# Patient Record
Sex: MALE | Race: WHITE | Employment: OTHER | URBAN - METROPOLITAN AREA
[De-identification: names, ages, dates, MRNs, and addresses within clinical notes are randomized per-mention and may not be internally consistent; named-entity substitution may affect disease eponyms.]

---

## 2017-08-30 ENCOUNTER — HOSPITAL ENCOUNTER (OUTPATIENT)
Dept: LAB | Age: 52
Discharge: HOME | End: 2017-08-30

## 2017-08-30 PROCEDURE — 88305 TISSUE EXAM BY PATHOLOGIST: CPT | Performed by: INTERNAL MEDICINE

## 2017-08-30 PROCEDURE — 88312 SPECIAL STAINS GROUP 1: CPT | Performed by: INTERNAL MEDICINE

## 2020-01-01 ENCOUNTER — APPOINTMENT (OUTPATIENT)
Dept: GENERAL RADIOLOGY | Age: 55
DRG: 603 | End: 2020-01-01
Attending: NURSE PRACTITIONER
Payer: COMMERCIAL

## 2020-01-01 ENCOUNTER — HOSPITAL ENCOUNTER (INPATIENT)
Age: 55
LOS: 5 days | Discharge: NURSING, REHAB OR LONG-TERM CARE | DRG: 603 | End: 2020-11-05
Attending: EMERGENCY MEDICINE | Admitting: HOSPITALIST
Payer: COMMERCIAL

## 2020-01-01 ENCOUNTER — APPOINTMENT (OUTPATIENT)
Dept: GENERAL RADIOLOGY | Age: 55
DRG: 603 | End: 2020-01-01
Attending: HOSPITALIST
Payer: COMMERCIAL

## 2020-01-01 VITALS
DIASTOLIC BLOOD PRESSURE: 84 MMHG | BODY MASS INDEX: 42.66 KG/M2 | HEART RATE: 76 BPM | RESPIRATION RATE: 18 BRPM | WEIGHT: 315 LBS | HEIGHT: 72 IN | OXYGEN SATURATION: 93 % | TEMPERATURE: 98.5 F | SYSTOLIC BLOOD PRESSURE: 145 MMHG

## 2020-01-01 DIAGNOSIS — S31.109A OPEN ABDOMINAL WALL WOUND, INITIAL ENCOUNTER: Primary | ICD-10-CM

## 2020-01-01 DIAGNOSIS — M48.062 LUMBAR STENOSIS WITH NEUROGENIC CLAUDICATION: ICD-10-CM

## 2020-01-01 LAB
ALBUMIN SERPL-MCNC: 2.6 G/DL (ref 3.5–5)
ALBUMIN SERPL-MCNC: 3.3 G/DL (ref 3.5–5)
ALBUMIN/GLOB SERPL: 0.7 {RATIO} (ref 1.2–3.5)
ALBUMIN/GLOB SERPL: 0.8 {RATIO} (ref 1.2–3.5)
ALP SERPL-CCNC: 100 U/L (ref 50–136)
ALP SERPL-CCNC: 84 U/L (ref 50–136)
ALT SERPL-CCNC: 57 U/L (ref 12–65)
ALT SERPL-CCNC: 75 U/L (ref 12–65)
ANION GAP SERPL CALC-SCNC: 3 MMOL/L (ref 7–16)
ANION GAP SERPL CALC-SCNC: 5 MMOL/L (ref 7–16)
ANION GAP SERPL CALC-SCNC: 5 MMOL/L (ref 7–16)
ANION GAP SERPL CALC-SCNC: 6 MMOL/L (ref 7–16)
ANION GAP SERPL CALC-SCNC: 9 MMOL/L (ref 7–16)
AST SERPL-CCNC: 39 U/L (ref 15–37)
AST SERPL-CCNC: 42 U/L (ref 15–37)
BACTERIA SPEC CULT: ABNORMAL
BACTERIA SPEC CULT: NORMAL
BACTERIA SPEC CULT: NORMAL
BASOPHILS # BLD: 0.1 K/UL (ref 0–0.2)
BASOPHILS NFR BLD: 1 % (ref 0–2)
BILIRUB SERPL-MCNC: 0.5 MG/DL (ref 0.2–1.1)
BILIRUB SERPL-MCNC: 0.6 MG/DL (ref 0.2–1.1)
BUN SERPL-MCNC: 11 MG/DL (ref 6–23)
BUN SERPL-MCNC: 15 MG/DL (ref 6–23)
BUN SERPL-MCNC: 15 MG/DL (ref 6–23)
BUN SERPL-MCNC: 8 MG/DL (ref 6–23)
BUN SERPL-MCNC: 9 MG/DL (ref 6–23)
CALCIUM SERPL-MCNC: 7.7 MG/DL (ref 8.3–10.4)
CALCIUM SERPL-MCNC: 8 MG/DL (ref 8.3–10.4)
CALCIUM SERPL-MCNC: 8.2 MG/DL (ref 8.3–10.4)
CALCIUM SERPL-MCNC: 8.4 MG/DL (ref 8.3–10.4)
CALCIUM SERPL-MCNC: 9.1 MG/DL (ref 8.3–10.4)
CHLORIDE SERPL-SCNC: 104 MMOL/L (ref 98–107)
CHLORIDE SERPL-SCNC: 109 MMOL/L (ref 98–107)
CHLORIDE SERPL-SCNC: 110 MMOL/L (ref 98–107)
CHLORIDE SERPL-SCNC: 111 MMOL/L (ref 98–107)
CHLORIDE SERPL-SCNC: 111 MMOL/L (ref 98–107)
CO2 SERPL-SCNC: 26 MMOL/L (ref 21–32)
CO2 SERPL-SCNC: 27 MMOL/L (ref 21–32)
CO2 SERPL-SCNC: 28 MMOL/L (ref 21–32)
COVID-19 RAPID TEST, COVR: NOT DETECTED
CREAT SERPL-MCNC: 0.86 MG/DL (ref 0.8–1.5)
CREAT SERPL-MCNC: 0.89 MG/DL (ref 0.8–1.5)
CREAT SERPL-MCNC: 0.95 MG/DL (ref 0.8–1.5)
CREAT SERPL-MCNC: 0.95 MG/DL (ref 0.8–1.5)
CREAT SERPL-MCNC: 1.06 MG/DL (ref 0.8–1.5)
DIFFERENTIAL METHOD BLD: ABNORMAL
EOSINOPHIL # BLD: 0.1 K/UL (ref 0–0.8)
EOSINOPHIL # BLD: 0.2 K/UL (ref 0–0.8)
EOSINOPHIL NFR BLD: 1 % (ref 0.5–7.8)
EOSINOPHIL NFR BLD: 3 % (ref 0.5–7.8)
ERYTHROCYTE [DISTWIDTH] IN BLOOD BY AUTOMATED COUNT: 13.8 % (ref 11.9–14.6)
ERYTHROCYTE [DISTWIDTH] IN BLOOD BY AUTOMATED COUNT: 13.9 % (ref 11.9–14.6)
ERYTHROCYTE [DISTWIDTH] IN BLOOD BY AUTOMATED COUNT: 14 % (ref 11.9–14.6)
GLOBULIN SER CALC-MCNC: 3.8 G/DL (ref 2.3–3.5)
GLOBULIN SER CALC-MCNC: 4.4 G/DL (ref 2.3–3.5)
GLUCOSE BLD STRIP.AUTO-MCNC: 102 MG/DL (ref 65–100)
GLUCOSE BLD STRIP.AUTO-MCNC: 107 MG/DL (ref 65–100)
GLUCOSE BLD STRIP.AUTO-MCNC: 108 MG/DL (ref 65–100)
GLUCOSE BLD STRIP.AUTO-MCNC: 111 MG/DL (ref 65–100)
GLUCOSE BLD STRIP.AUTO-MCNC: 111 MG/DL (ref 65–100)
GLUCOSE BLD STRIP.AUTO-MCNC: 115 MG/DL (ref 65–100)
GLUCOSE BLD STRIP.AUTO-MCNC: 118 MG/DL (ref 65–100)
GLUCOSE BLD STRIP.AUTO-MCNC: 120 MG/DL (ref 65–100)
GLUCOSE BLD STRIP.AUTO-MCNC: 120 MG/DL (ref 65–100)
GLUCOSE BLD STRIP.AUTO-MCNC: 122 MG/DL (ref 65–100)
GLUCOSE BLD STRIP.AUTO-MCNC: 124 MG/DL (ref 65–100)
GLUCOSE BLD STRIP.AUTO-MCNC: 124 MG/DL (ref 65–100)
GLUCOSE BLD STRIP.AUTO-MCNC: 128 MG/DL (ref 65–100)
GLUCOSE BLD STRIP.AUTO-MCNC: 130 MG/DL (ref 65–100)
GLUCOSE BLD STRIP.AUTO-MCNC: 136 MG/DL (ref 65–100)
GLUCOSE BLD STRIP.AUTO-MCNC: 142 MG/DL (ref 65–100)
GLUCOSE BLD STRIP.AUTO-MCNC: 83 MG/DL (ref 65–100)
GLUCOSE BLD STRIP.AUTO-MCNC: 86 MG/DL (ref 65–100)
GLUCOSE BLD STRIP.AUTO-MCNC: 95 MG/DL (ref 65–100)
GLUCOSE BLD STRIP.AUTO-MCNC: 97 MG/DL (ref 65–100)
GLUCOSE SERPL-MCNC: 109 MG/DL (ref 65–100)
GLUCOSE SERPL-MCNC: 84 MG/DL (ref 65–100)
GLUCOSE SERPL-MCNC: 92 MG/DL (ref 65–100)
GLUCOSE SERPL-MCNC: 93 MG/DL (ref 65–100)
GLUCOSE SERPL-MCNC: 93 MG/DL (ref 65–100)
GRAM STN SPEC: ABNORMAL
GRAM STN SPEC: ABNORMAL
HCT VFR BLD AUTO: 30.9 % (ref 41.1–50.3)
HCT VFR BLD AUTO: 31.1 % (ref 41.1–50.3)
HCT VFR BLD AUTO: 31.9 % (ref 41.1–50.3)
HCT VFR BLD AUTO: 34.5 % (ref 41.1–50.3)
HCT VFR BLD AUTO: 35 % (ref 41.1–50.3)
HGB BLD-MCNC: 10.3 G/DL (ref 13.6–17.2)
HGB BLD-MCNC: 10.4 G/DL (ref 13.6–17.2)
HGB BLD-MCNC: 10.6 G/DL (ref 13.6–17.2)
HGB BLD-MCNC: 11.7 G/DL (ref 13.6–17.2)
HGB BLD-MCNC: 11.7 G/DL (ref 13.6–17.2)
IMM GRANULOCYTES # BLD AUTO: 0.1 K/UL (ref 0–0.5)
IMM GRANULOCYTES NFR BLD AUTO: 1 % (ref 0–5)
IMM GRANULOCYTES NFR BLD AUTO: 2 % (ref 0–5)
LACTATE SERPL-SCNC: 1 MMOL/L (ref 0.4–2)
LACTATE SERPL-SCNC: 1 MMOL/L (ref 0.4–2)
LYMPHOCYTES # BLD: 1.5 K/UL (ref 0.5–4.6)
LYMPHOCYTES # BLD: 1.6 K/UL (ref 0.5–4.6)
LYMPHOCYTES # BLD: 1.6 K/UL (ref 0.5–4.6)
LYMPHOCYTES # BLD: 1.8 K/UL (ref 0.5–4.6)
LYMPHOCYTES # BLD: 2 K/UL (ref 0.5–4.6)
LYMPHOCYTES NFR BLD: 17 % (ref 13–44)
LYMPHOCYTES NFR BLD: 28 % (ref 13–44)
LYMPHOCYTES NFR BLD: 28 % (ref 13–44)
LYMPHOCYTES NFR BLD: 30 % (ref 13–44)
LYMPHOCYTES NFR BLD: 30 % (ref 13–44)
MCH RBC QN AUTO: 32.5 PG (ref 26.1–32.9)
MCH RBC QN AUTO: 32.7 PG (ref 26.1–32.9)
MCH RBC QN AUTO: 33 PG (ref 26.1–32.9)
MCHC RBC AUTO-ENTMCNC: 33.1 G/DL (ref 31.4–35)
MCHC RBC AUTO-ENTMCNC: 33.2 G/DL (ref 31.4–35)
MCHC RBC AUTO-ENTMCNC: 33.4 G/DL (ref 31.4–35)
MCHC RBC AUTO-ENTMCNC: 33.7 G/DL (ref 31.4–35)
MCHC RBC AUTO-ENTMCNC: 33.9 G/DL (ref 31.4–35)
MCV RBC AUTO: 96.4 FL (ref 79.6–97.8)
MCV RBC AUTO: 97.2 FL (ref 79.6–97.8)
MCV RBC AUTO: 98.1 FL (ref 79.6–97.8)
MCV RBC AUTO: 98.5 FL (ref 79.6–97.8)
MCV RBC AUTO: 98.6 FL (ref 79.6–97.8)
MM INDURATION POC: 0 MM (ref 0–5)
MM INDURATION POC: 0 MM (ref 0–5)
MONOCYTES # BLD: 0.5 K/UL (ref 0.1–1.3)
MONOCYTES # BLD: 0.6 K/UL (ref 0.1–1.3)
MONOCYTES # BLD: 1.1 K/UL (ref 0.1–1.3)
MONOCYTES NFR BLD: 10 % (ref 4–12)
MONOCYTES NFR BLD: 10 % (ref 4–12)
MONOCYTES NFR BLD: 11 % (ref 4–12)
MONOCYTES NFR BLD: 11 % (ref 4–12)
MONOCYTES NFR BLD: 8 % (ref 4–12)
NEUTS SEG # BLD: 2.8 K/UL (ref 1.7–8.2)
NEUTS SEG # BLD: 3.2 K/UL (ref 1.7–8.2)
NEUTS SEG # BLD: 3.2 K/UL (ref 1.7–8.2)
NEUTS SEG # BLD: 4.3 K/UL (ref 1.7–8.2)
NEUTS SEG # BLD: 6.8 K/UL (ref 1.7–8.2)
NEUTS SEG NFR BLD: 55 % (ref 43–78)
NEUTS SEG NFR BLD: 55 % (ref 43–78)
NEUTS SEG NFR BLD: 57 % (ref 43–78)
NEUTS SEG NFR BLD: 59 % (ref 43–78)
NEUTS SEG NFR BLD: 69 % (ref 43–78)
NRBC # BLD: 0 K/UL (ref 0–0.2)
PLATELET # BLD AUTO: 159 K/UL (ref 150–450)
PLATELET # BLD AUTO: 201 K/UL (ref 150–450)
PLATELET # BLD AUTO: 204 K/UL (ref 150–450)
PLATELET # BLD AUTO: 205 K/UL (ref 150–450)
PLATELET # BLD AUTO: 216 K/UL (ref 150–450)
PMV BLD AUTO: 8.7 FL (ref 9.4–12.3)
PMV BLD AUTO: 8.9 FL (ref 9.4–12.3)
PMV BLD AUTO: 9 FL (ref 9.4–12.3)
PMV BLD AUTO: 9 FL (ref 9.4–12.3)
PMV BLD AUTO: 9.4 FL (ref 9.4–12.3)
POTASSIUM SERPL-SCNC: 3.4 MMOL/L (ref 3.5–5.1)
POTASSIUM SERPL-SCNC: 3.7 MMOL/L (ref 3.5–5.1)
POTASSIUM SERPL-SCNC: 3.7 MMOL/L (ref 3.5–5.1)
POTASSIUM SERPL-SCNC: 3.9 MMOL/L (ref 3.5–5.1)
POTASSIUM SERPL-SCNC: 4 MMOL/L (ref 3.5–5.1)
PPD POC: NEGATIVE NEGATIVE
PPD POC: NEGATIVE NEGATIVE
PROCALCITONIN SERPL-MCNC: <0.05 NG/ML
PROT SERPL-MCNC: 6.4 G/DL (ref 6.3–8.2)
PROT SERPL-MCNC: 7.7 G/DL (ref 6.3–8.2)
RBC # BLD AUTO: 3.17 M/UL (ref 4.23–5.6)
RBC # BLD AUTO: 3.18 M/UL (ref 4.23–5.6)
RBC # BLD AUTO: 3.24 M/UL (ref 4.23–5.6)
RBC # BLD AUTO: 3.55 M/UL (ref 4.23–5.6)
RBC # BLD AUTO: 3.58 M/UL (ref 4.23–5.6)
SARS COV-2, XPGCVT: NEGATIVE
SERVICE CMNT-IMP: ABNORMAL
SERVICE CMNT-IMP: NORMAL
SERVICE CMNT-IMP: NORMAL
SODIUM SERPL-SCNC: 139 MMOL/L (ref 138–145)
SODIUM SERPL-SCNC: 141 MMOL/L (ref 136–145)
SODIUM SERPL-SCNC: 142 MMOL/L (ref 136–145)
SODIUM SERPL-SCNC: 143 MMOL/L (ref 136–145)
SODIUM SERPL-SCNC: 143 MMOL/L (ref 136–145)
SOURCE, COVRS: NORMAL
VANCOMYCIN TROUGH SERPL-MCNC: 13 UG/ML (ref 5–20)
WBC # BLD AUTO: 5.1 K/UL (ref 4.3–11.1)
WBC # BLD AUTO: 5.6 K/UL (ref 4.3–11.1)
WBC # BLD AUTO: 5.9 K/UL (ref 4.3–11.1)
WBC # BLD AUTO: 7.3 K/UL (ref 4.3–11.1)
WBC # BLD AUTO: 9.8 K/UL (ref 4.3–11.1)

## 2020-01-01 PROCEDURE — 85025 COMPLETE CBC W/AUTO DIFF WBC: CPT

## 2020-01-01 PROCEDURE — 65270000029 HC RM PRIVATE

## 2020-01-01 PROCEDURE — 2709999900 HC NON-CHARGEABLE SUPPLY

## 2020-01-01 PROCEDURE — 82962 GLUCOSE BLOOD TEST: CPT

## 2020-01-01 PROCEDURE — 36415 COLL VENOUS BLD VENIPUNCTURE: CPT

## 2020-01-01 PROCEDURE — 86580 TB INTRADERMAL TEST: CPT | Performed by: INTERNAL MEDICINE

## 2020-01-01 PROCEDURE — 80053 COMPREHEN METABOLIC PANEL: CPT

## 2020-01-01 PROCEDURE — 87040 BLOOD CULTURE FOR BACTERIA: CPT

## 2020-01-01 PROCEDURE — 74011000258 HC RX REV CODE- 258: Performed by: HOSPITALIST

## 2020-01-01 PROCEDURE — 74011250636 HC RX REV CODE- 250/636: Performed by: HOSPITALIST

## 2020-01-01 PROCEDURE — 74011250636 HC RX REV CODE- 250/636: Performed by: FAMILY MEDICINE

## 2020-01-01 PROCEDURE — 74018 RADEX ABDOMEN 1 VIEW: CPT

## 2020-01-01 PROCEDURE — 87077 CULTURE AEROBIC IDENTIFY: CPT

## 2020-01-01 PROCEDURE — 87205 SMEAR GRAM STAIN: CPT

## 2020-01-01 PROCEDURE — 74011250637 HC RX REV CODE- 250/637: Performed by: NURSE PRACTITIONER

## 2020-01-01 PROCEDURE — 96375 TX/PRO/DX INJ NEW DRUG ADDON: CPT

## 2020-01-01 PROCEDURE — 83605 ASSAY OF LACTIC ACID: CPT

## 2020-01-01 PROCEDURE — 84145 PROCALCITONIN (PCT): CPT

## 2020-01-01 PROCEDURE — 74011250637 HC RX REV CODE- 250/637: Performed by: HOSPITALIST

## 2020-01-01 PROCEDURE — 97110 THERAPEUTIC EXERCISES: CPT

## 2020-01-01 PROCEDURE — 97161 PT EVAL LOW COMPLEX 20 MIN: CPT

## 2020-01-01 PROCEDURE — 74011000302 HC RX REV CODE- 302: Performed by: INTERNAL MEDICINE

## 2020-01-01 PROCEDURE — 80048 BASIC METABOLIC PNL TOTAL CA: CPT

## 2020-01-01 PROCEDURE — 74011250636 HC RX REV CODE- 250/636: Performed by: EMERGENCY MEDICINE

## 2020-01-01 PROCEDURE — 99284 EMERGENCY DEPT VISIT MOD MDM: CPT

## 2020-01-01 PROCEDURE — 96366 THER/PROPH/DIAG IV INF ADDON: CPT

## 2020-01-01 PROCEDURE — 97530 THERAPEUTIC ACTIVITIES: CPT

## 2020-01-01 PROCEDURE — 96365 THER/PROPH/DIAG IV INF INIT: CPT

## 2020-01-01 PROCEDURE — 94660 CPAP INITIATION&MGMT: CPT

## 2020-01-01 PROCEDURE — 74011000250 HC RX REV CODE- 250: Performed by: FAMILY MEDICINE

## 2020-01-01 PROCEDURE — 87186 SC STD MICRODIL/AGAR DIL: CPT

## 2020-01-01 PROCEDURE — A4565 SLINGS: HCPCS

## 2020-01-01 PROCEDURE — 80202 ASSAY OF VANCOMYCIN: CPT

## 2020-01-01 PROCEDURE — 87635 SARS-COV-2 COVID-19 AMP PRB: CPT

## 2020-01-01 PROCEDURE — 97535 SELF CARE MNGMENT TRAINING: CPT

## 2020-01-01 PROCEDURE — 73030 X-RAY EXAM OF SHOULDER: CPT

## 2020-01-01 PROCEDURE — 77030018846 HC SOL IRR STRL H20 ICUM -A

## 2020-01-01 PROCEDURE — 74011000250 HC RX REV CODE- 250: Performed by: NURSE PRACTITIONER

## 2020-01-01 PROCEDURE — 72040 X-RAY EXAM NECK SPINE 2-3 VW: CPT

## 2020-01-01 PROCEDURE — 74011000258 HC RX REV CODE- 258: Performed by: EMERGENCY MEDICINE

## 2020-01-01 PROCEDURE — 97166 OT EVAL MOD COMPLEX 45 MIN: CPT

## 2020-01-01 PROCEDURE — 74011250636 HC RX REV CODE- 250/636: Performed by: NURSE PRACTITIONER

## 2020-01-01 PROCEDURE — 77030040361 HC SLV COMPR DVT MDII -B

## 2020-01-01 RX ORDER — CLONAZEPAM 2 MG/1
2 TABLET ORAL DAILY
Qty: 3 TAB | Refills: 0 | Status: SHIPPED | OUTPATIENT
Start: 2020-01-01 | End: 2020-01-01

## 2020-01-01 RX ORDER — HYDRALAZINE HYDROCHLORIDE 20 MG/ML
10 INJECTION INTRAMUSCULAR; INTRAVENOUS ONCE
Status: COMPLETED | OUTPATIENT
Start: 2020-01-01 | End: 2020-01-01

## 2020-01-01 RX ORDER — CARVEDILOL 6.25 MG/1
6.25 TABLET ORAL 2 TIMES DAILY WITH MEALS
Status: DISCONTINUED | OUTPATIENT
Start: 2020-01-01 | End: 2020-01-01 | Stop reason: HOSPADM

## 2020-01-01 RX ORDER — POTASSIUM CHLORIDE 20 MEQ/1
40 TABLET, EXTENDED RELEASE ORAL 2 TIMES DAILY
Status: COMPLETED | OUTPATIENT
Start: 2020-01-01 | End: 2020-01-01

## 2020-01-01 RX ORDER — OXYCODONE HYDROCHLORIDE 5 MG/1
5 TABLET ORAL
Status: DISCONTINUED | OUTPATIENT
Start: 2020-01-01 | End: 2020-01-01 | Stop reason: HOSPADM

## 2020-01-01 RX ORDER — ONDANSETRON 2 MG/ML
4 INJECTION INTRAMUSCULAR; INTRAVENOUS
Status: CANCELLED | OUTPATIENT
Start: 2020-01-01

## 2020-01-01 RX ORDER — SODIUM CHLORIDE 9 MG/ML
100 INJECTION, SOLUTION INTRAVENOUS CONTINUOUS
Status: DISCONTINUED | OUTPATIENT
Start: 2020-01-01 | End: 2020-01-01 | Stop reason: HOSPADM

## 2020-01-01 RX ORDER — LANOLIN ALCOHOL/MO/W.PET/CERES
CREAM (GRAM) TOPICAL DAILY
COMMUNITY
End: 2020-01-01

## 2020-01-01 RX ORDER — HYDROCODONE BITARTRATE AND ACETAMINOPHEN 5; 325 MG/1; MG/1
1 TABLET ORAL
Status: DISCONTINUED | OUTPATIENT
Start: 2020-01-01 | End: 2020-01-01

## 2020-01-01 RX ORDER — SPIRONOLACTONE 25 MG/1
50 TABLET ORAL DAILY
Status: DISCONTINUED | OUTPATIENT
Start: 2020-01-01 | End: 2020-01-01 | Stop reason: HOSPADM

## 2020-01-01 RX ORDER — ARIPIPRAZOLE 5 MG/1
15 TABLET ORAL DAILY
Status: DISCONTINUED | OUTPATIENT
Start: 2020-01-01 | End: 2020-01-01 | Stop reason: HOSPADM

## 2020-01-01 RX ORDER — HYDRALAZINE HYDROCHLORIDE 20 MG/ML
10 INJECTION INTRAMUSCULAR; INTRAVENOUS
Status: DISCONTINUED | OUTPATIENT
Start: 2020-01-01 | End: 2020-01-01 | Stop reason: HOSPADM

## 2020-01-01 RX ORDER — VANCOMYCIN/0.9 % SOD CHLORIDE 1.5G/250ML
1500 PLASTIC BAG, INJECTION (ML) INTRAVENOUS EVERY 12 HOURS
Status: DISCONTINUED | OUTPATIENT
Start: 2020-01-01 | End: 2020-01-01

## 2020-01-01 RX ORDER — CARVEDILOL 3.12 MG/1
3.12 TABLET ORAL 2 TIMES DAILY WITH MEALS
Status: DISCONTINUED | OUTPATIENT
Start: 2020-01-01 | End: 2020-01-01

## 2020-01-01 RX ORDER — SPIRONOLACTONE 50 MG/1
50 TABLET, FILM COATED ORAL DAILY
COMMUNITY

## 2020-01-01 RX ORDER — CIPROFLOXACIN 500 MG/1
500 TABLET ORAL 2 TIMES DAILY
Qty: 20 TAB | Refills: 0 | Status: SHIPPED | OUTPATIENT
Start: 2020-01-01 | End: 2020-01-01

## 2020-01-01 RX ORDER — FINASTERIDE 5 MG/1
5 TABLET, FILM COATED ORAL DAILY
Status: DISCONTINUED | OUTPATIENT
Start: 2020-01-01 | End: 2020-01-01 | Stop reason: HOSPADM

## 2020-01-01 RX ORDER — TRAZODONE HYDROCHLORIDE 50 MG/1
300 TABLET ORAL
Status: DISCONTINUED | OUTPATIENT
Start: 2020-01-01 | End: 2020-01-01 | Stop reason: HOSPADM

## 2020-01-01 RX ORDER — CLONAZEPAM 1 MG/1
2 TABLET ORAL DAILY
Status: DISCONTINUED | OUTPATIENT
Start: 2020-01-01 | End: 2020-01-01 | Stop reason: HOSPADM

## 2020-01-01 RX ORDER — CARVEDILOL 6.25 MG/1
6.25 TABLET ORAL 2 TIMES DAILY WITH MEALS
Qty: 60 TAB | Refills: 11 | Status: SHIPPED | OUTPATIENT
Start: 2020-01-01 | End: 2020-01-01

## 2020-01-01 RX ORDER — LABETALOL HYDROCHLORIDE 5 MG/ML
10 INJECTION, SOLUTION INTRAVENOUS ONCE
Status: COMPLETED | OUTPATIENT
Start: 2020-01-01 | End: 2020-01-01

## 2020-01-01 RX ORDER — INSULIN LISPRO 100 [IU]/ML
INJECTION, SOLUTION INTRAVENOUS; SUBCUTANEOUS
Status: DISCONTINUED | OUTPATIENT
Start: 2020-01-01 | End: 2020-01-01 | Stop reason: HOSPADM

## 2020-01-01 RX ORDER — LAMOTRIGINE 100 MG/1
200 TABLET ORAL 2 TIMES DAILY
Status: DISCONTINUED | OUTPATIENT
Start: 2020-01-01 | End: 2020-01-01 | Stop reason: HOSPADM

## 2020-01-01 RX ORDER — GABAPENTIN 100 MG/1
100 CAPSULE ORAL 3 TIMES DAILY
Status: DISCONTINUED | OUTPATIENT
Start: 2020-01-01 | End: 2020-01-01 | Stop reason: HOSPADM

## 2020-01-01 RX ORDER — SODIUM CHLORIDE 9 MG/ML
125 INJECTION, SOLUTION INTRAVENOUS CONTINUOUS
Status: DISCONTINUED | OUTPATIENT
Start: 2020-01-01 | End: 2020-01-01

## 2020-01-01 RX ORDER — GABAPENTIN 100 MG/1
CAPSULE ORAL 3 TIMES DAILY
COMMUNITY

## 2020-01-01 RX ORDER — LORAZEPAM 2 MG/ML
0.5 INJECTION INTRAMUSCULAR ONCE
Status: COMPLETED | OUTPATIENT
Start: 2020-01-01 | End: 2020-01-01

## 2020-01-01 RX ORDER — CLONAZEPAM 1 MG/1
2 TABLET ORAL 2 TIMES DAILY
Status: DISCONTINUED | OUTPATIENT
Start: 2020-01-01 | End: 2020-01-01

## 2020-01-01 RX ORDER — SODIUM CHLORIDE 0.9 % (FLUSH) 0.9 %
5-40 SYRINGE (ML) INJECTION AS NEEDED
Status: DISCONTINUED | OUTPATIENT
Start: 2020-01-01 | End: 2020-01-01 | Stop reason: HOSPADM

## 2020-01-01 RX ORDER — POLYETHYLENE GLYCOL 3350 17 G/17G
17 POWDER, FOR SOLUTION ORAL DAILY
Status: DISCONTINUED | OUTPATIENT
Start: 2020-01-01 | End: 2020-01-01 | Stop reason: HOSPADM

## 2020-01-01 RX ORDER — MORPHINE SULFATE 2 MG/ML
2 INJECTION, SOLUTION INTRAMUSCULAR; INTRAVENOUS
Status: DISCONTINUED | OUTPATIENT
Start: 2020-01-01 | End: 2020-01-01

## 2020-01-01 RX ORDER — CARVEDILOL 3.12 MG/1
3.12 TABLET ORAL 2 TIMES DAILY WITH MEALS
COMMUNITY
End: 2020-01-01

## 2020-01-01 RX ORDER — SODIUM CHLORIDE 0.9 % (FLUSH) 0.9 %
5-40 SYRINGE (ML) INJECTION EVERY 8 HOURS
Status: DISCONTINUED | OUTPATIENT
Start: 2020-01-01 | End: 2020-01-01 | Stop reason: HOSPADM

## 2020-01-01 RX ORDER — LEVOTHYROXINE SODIUM 112 UG/1
112 TABLET ORAL
Status: DISCONTINUED | OUTPATIENT
Start: 2020-01-01 | End: 2020-01-01 | Stop reason: HOSPADM

## 2020-01-01 RX ORDER — HYDROCODONE BITARTRATE AND ACETAMINOPHEN 7.5; 325 MG/1; MG/1
1 TABLET ORAL
Status: DISCONTINUED | OUTPATIENT
Start: 2020-01-01 | End: 2020-01-01

## 2020-01-01 RX ORDER — FOLIC ACID 1 MG/1
1 TABLET ORAL DAILY
Status: DISCONTINUED | OUTPATIENT
Start: 2020-01-01 | End: 2020-01-01 | Stop reason: HOSPADM

## 2020-01-01 RX ORDER — TAMSULOSIN HYDROCHLORIDE 0.4 MG/1
0.4 CAPSULE ORAL DAILY
Status: DISCONTINUED | OUTPATIENT
Start: 2020-01-01 | End: 2020-01-01 | Stop reason: HOSPADM

## 2020-01-01 RX ORDER — NALOXONE HYDROCHLORIDE 0.4 MG/ML
0.4 INJECTION, SOLUTION INTRAMUSCULAR; INTRAVENOUS; SUBCUTANEOUS AS NEEDED
Status: DISCONTINUED | OUTPATIENT
Start: 2020-01-01 | End: 2020-01-01 | Stop reason: HOSPADM

## 2020-01-01 RX ORDER — PANTOPRAZOLE SODIUM 40 MG/1
40 TABLET, DELAYED RELEASE ORAL
Status: DISCONTINUED | OUTPATIENT
Start: 2020-01-01 | End: 2020-01-01 | Stop reason: HOSPADM

## 2020-01-01 RX ORDER — VENLAFAXINE HYDROCHLORIDE 37.5 MG/1
75 CAPSULE, EXTENDED RELEASE ORAL DAILY
Status: DISCONTINUED | OUTPATIENT
Start: 2020-01-01 | End: 2020-01-01 | Stop reason: HOSPADM

## 2020-01-01 RX ORDER — VANCOMYCIN HYDROCHLORIDE 1 G/20ML
INJECTION, POWDER, LYOPHILIZED, FOR SOLUTION INTRAVENOUS ONCE
Status: DISCONTINUED | OUTPATIENT
Start: 2020-01-01 | End: 2020-01-01 | Stop reason: DRUGHIGH

## 2020-01-01 RX ORDER — OXYCODONE HYDROCHLORIDE 5 MG/1
5 TABLET ORAL
Qty: 18 TAB | Refills: 0 | Status: SHIPPED | OUTPATIENT
Start: 2020-01-01 | End: 2020-01-01

## 2020-01-01 RX ORDER — VANCOMYCIN 2 GRAM/500 ML IN 0.9 % SODIUM CHLORIDE INTRAVENOUS
2000 ONCE
Status: COMPLETED | OUTPATIENT
Start: 2020-01-01 | End: 2020-01-01

## 2020-01-01 RX ORDER — ONDANSETRON 2 MG/ML
4 INJECTION INTRAMUSCULAR; INTRAVENOUS
Status: DISCONTINUED | OUTPATIENT
Start: 2020-01-01 | End: 2020-01-01 | Stop reason: HOSPADM

## 2020-01-01 RX ORDER — LANOLIN ALCOHOL/MO/W.PET/CERES
100 CREAM (GRAM) TOPICAL DAILY
Status: DISCONTINUED | OUTPATIENT
Start: 2020-01-01 | End: 2020-01-01 | Stop reason: HOSPADM

## 2020-01-01 RX ORDER — CLONAZEPAM 1 MG/1
4 TABLET ORAL
Status: DISCONTINUED | OUTPATIENT
Start: 2020-01-01 | End: 2020-01-01 | Stop reason: HOSPADM

## 2020-01-01 RX ORDER — ATORVASTATIN CALCIUM 10 MG/1
10 TABLET, FILM COATED ORAL DAILY
Status: DISCONTINUED | OUTPATIENT
Start: 2020-01-01 | End: 2020-01-01 | Stop reason: HOSPADM

## 2020-01-01 RX ADMIN — GABAPENTIN 100 MG: 100 CAPSULE ORAL at 08:43

## 2020-01-01 RX ADMIN — HYDROCODONE BITARTRATE AND ACETAMINOPHEN 1 TABLET: 7.5; 325 TABLET ORAL at 00:50

## 2020-01-01 RX ADMIN — GABAPENTIN 100 MG: 100 CAPSULE ORAL at 16:19

## 2020-01-01 RX ADMIN — SODIUM HYPOCHLORITE: 1.25 SOLUTION TOPICAL at 17:46

## 2020-01-01 RX ADMIN — FINASTERIDE 5 MG: 5 TABLET, FILM COATED ORAL at 09:04

## 2020-01-01 RX ADMIN — RIVAROXABAN 20 MG: 20 TABLET, FILM COATED ORAL at 08:56

## 2020-01-01 RX ADMIN — MORPHINE SULFATE 2 MG: 2 INJECTION, SOLUTION INTRAMUSCULAR; INTRAVENOUS at 09:01

## 2020-01-01 RX ADMIN — LAMOTRIGINE 200 MG: 100 TABLET ORAL at 17:12

## 2020-01-01 RX ADMIN — LAMOTRIGINE 200 MG: 100 TABLET ORAL at 08:19

## 2020-01-01 RX ADMIN — SODIUM HYPOCHLORITE: 1.25 SOLUTION TOPICAL at 17:23

## 2020-01-01 RX ADMIN — VANCOMYCIN HYDROCHLORIDE 1500 MG: 10 INJECTION, POWDER, LYOPHILIZED, FOR SOLUTION INTRAVENOUS at 23:18

## 2020-01-01 RX ADMIN — PIPERACILLIN SODIUM AND TAZOBACTAM SODIUM 3.38 G: 3; .375 INJECTION, POWDER, LYOPHILIZED, FOR SOLUTION INTRAVENOUS at 17:44

## 2020-01-01 RX ADMIN — LEVOTHYROXINE SODIUM 112 MCG: 0.11 TABLET ORAL at 05:22

## 2020-01-01 RX ADMIN — POLYETHYLENE GLYCOL 3350 17 G: 17 POWDER, FOR SOLUTION ORAL at 08:34

## 2020-01-01 RX ADMIN — CARVEDILOL 3.12 MG: 3.12 TABLET, FILM COATED ORAL at 09:15

## 2020-01-01 RX ADMIN — LORAZEPAM 0.5 MG: 2 INJECTION INTRAMUSCULAR; INTRAVENOUS at 11:06

## 2020-01-01 RX ADMIN — CLONAZEPAM 2 MG: 1 TABLET ORAL at 09:04

## 2020-01-01 RX ADMIN — GABAPENTIN 100 MG: 100 CAPSULE ORAL at 09:05

## 2020-01-01 RX ADMIN — CARVEDILOL 3.12 MG: 3.12 TABLET, FILM COATED ORAL at 16:19

## 2020-01-01 RX ADMIN — HYDROCODONE BITARTRATE AND ACETAMINOPHEN 1 TABLET: 7.5; 325 TABLET ORAL at 13:24

## 2020-01-01 RX ADMIN — SODIUM CHLORIDE 100 ML/HR: 900 INJECTION, SOLUTION INTRAVENOUS at 22:10

## 2020-01-01 RX ADMIN — TAMSULOSIN HYDROCHLORIDE 0.4 MG: 0.4 CAPSULE ORAL at 08:57

## 2020-01-01 RX ADMIN — HYDROCODONE BITARTRATE AND ACETAMINOPHEN 1 TABLET: 7.5; 325 TABLET ORAL at 05:12

## 2020-01-01 RX ADMIN — LEVOTHYROXINE SODIUM 112 MCG: 0.11 TABLET ORAL at 05:45

## 2020-01-01 RX ADMIN — SODIUM HYPOCHLORITE: 1.25 SOLUTION TOPICAL at 09:00

## 2020-01-01 RX ADMIN — SPIRONOLACTONE 50 MG: 25 TABLET ORAL at 09:05

## 2020-01-01 RX ADMIN — PIPERACILLIN SODIUM AND TAZOBACTAM SODIUM 3.38 G: 3; .375 INJECTION, POWDER, LYOPHILIZED, FOR SOLUTION INTRAVENOUS at 13:22

## 2020-01-01 RX ADMIN — FOLIC ACID 1 MG: 1 TABLET ORAL at 08:43

## 2020-01-01 RX ADMIN — TAMSULOSIN HYDROCHLORIDE 0.4 MG: 0.4 CAPSULE ORAL at 08:20

## 2020-01-01 RX ADMIN — ONDANSETRON 4 MG: 2 INJECTION INTRAMUSCULAR; INTRAVENOUS at 08:28

## 2020-01-01 RX ADMIN — LAMOTRIGINE 200 MG: 100 TABLET ORAL at 17:13

## 2020-01-01 RX ADMIN — SODIUM CHLORIDE 500 ML: 900 INJECTION, SOLUTION INTRAVENOUS at 10:39

## 2020-01-01 RX ADMIN — PIPERACILLIN SODIUM AND TAZOBACTAM SODIUM 3.38 G: 3; .375 INJECTION, POWDER, LYOPHILIZED, FOR SOLUTION INTRAVENOUS at 10:09

## 2020-01-01 RX ADMIN — FINASTERIDE 5 MG: 5 TABLET, FILM COATED ORAL at 08:43

## 2020-01-01 RX ADMIN — SPIRONOLACTONE 50 MG: 25 TABLET ORAL at 08:56

## 2020-01-01 RX ADMIN — GABAPENTIN 100 MG: 100 CAPSULE ORAL at 16:04

## 2020-01-01 RX ADMIN — GABAPENTIN 100 MG: 100 CAPSULE ORAL at 17:12

## 2020-01-01 RX ADMIN — PIPERACILLIN SODIUM AND TAZOBACTAM SODIUM 3.38 G: 3; .375 INJECTION, POWDER, LYOPHILIZED, FOR SOLUTION INTRAVENOUS at 01:11

## 2020-01-01 RX ADMIN — VENLAFAXINE HYDROCHLORIDE 75 MG: 37.5 CAPSULE, EXTENDED RELEASE ORAL at 09:15

## 2020-01-01 RX ADMIN — CLONAZEPAM 4 MG: 1 TABLET ORAL at 21:31

## 2020-01-01 RX ADMIN — FINASTERIDE 5 MG: 5 TABLET, FILM COATED ORAL at 08:20

## 2020-01-01 RX ADMIN — SODIUM CHLORIDE 100 ML/HR: 900 INJECTION, SOLUTION INTRAVENOUS at 08:44

## 2020-01-01 RX ADMIN — CLONAZEPAM 4 MG: 1 TABLET ORAL at 21:33

## 2020-01-01 RX ADMIN — PANTOPRAZOLE SODIUM 40 MG: 40 TABLET, DELAYED RELEASE ORAL at 05:13

## 2020-01-01 RX ADMIN — PIPERACILLIN SODIUM AND TAZOBACTAM SODIUM 3.38 G: 3; .375 INJECTION, POWDER, LYOPHILIZED, FOR SOLUTION INTRAVENOUS at 01:47

## 2020-01-01 RX ADMIN — GABAPENTIN 100 MG: 100 CAPSULE ORAL at 17:44

## 2020-01-01 RX ADMIN — OXYCODONE 5 MG: 5 TABLET ORAL at 10:05

## 2020-01-01 RX ADMIN — Medication 100 MG: at 08:35

## 2020-01-01 RX ADMIN — CARVEDILOL 3.12 MG: 3.12 TABLET, FILM COATED ORAL at 08:57

## 2020-01-01 RX ADMIN — Medication 10 ML: at 05:23

## 2020-01-01 RX ADMIN — ARIPIPRAZOLE 15 MG: 5 TABLET ORAL at 09:14

## 2020-01-01 RX ADMIN — GABAPENTIN 100 MG: 100 CAPSULE ORAL at 09:16

## 2020-01-01 RX ADMIN — Medication 100 MG: at 08:20

## 2020-01-01 RX ADMIN — RIVAROXABAN 20 MG: 20 TABLET, FILM COATED ORAL at 08:34

## 2020-01-01 RX ADMIN — GABAPENTIN 100 MG: 100 CAPSULE ORAL at 22:44

## 2020-01-01 RX ADMIN — CARVEDILOL 3.12 MG: 3.12 TABLET, FILM COATED ORAL at 08:20

## 2020-01-01 RX ADMIN — SODIUM CHLORIDE 100 ML/HR: 900 INJECTION, SOLUTION INTRAVENOUS at 01:32

## 2020-01-01 RX ADMIN — FOLIC ACID 1 MG: 1 TABLET ORAL at 08:57

## 2020-01-01 RX ADMIN — Medication 10 ML: at 13:19

## 2020-01-01 RX ADMIN — RIVAROXABAN 20 MG: 20 TABLET, FILM COATED ORAL at 08:20

## 2020-01-01 RX ADMIN — VENLAFAXINE HYDROCHLORIDE 75 MG: 37.5 CAPSULE, EXTENDED RELEASE ORAL at 08:20

## 2020-01-01 RX ADMIN — ATORVASTATIN CALCIUM 10 MG: 10 TABLET, FILM COATED ORAL at 08:57

## 2020-01-01 RX ADMIN — ARIPIPRAZOLE 15 MG: 5 TABLET ORAL at 08:36

## 2020-01-01 RX ADMIN — FINASTERIDE 5 MG: 5 TABLET, FILM COATED ORAL at 09:15

## 2020-01-01 RX ADMIN — POTASSIUM CHLORIDE 40 MEQ: 1500 TABLET, EXTENDED RELEASE ORAL at 17:17

## 2020-01-01 RX ADMIN — CLONAZEPAM 2 MG: 1 TABLET ORAL at 08:56

## 2020-01-01 RX ADMIN — Medication 10 ML: at 21:32

## 2020-01-01 RX ADMIN — LAMOTRIGINE 200 MG: 100 TABLET ORAL at 08:35

## 2020-01-01 RX ADMIN — Medication 100 MG: at 09:05

## 2020-01-01 RX ADMIN — PIPERACILLIN SODIUM AND TAZOBACTAM SODIUM 3.38 G: 3; .375 INJECTION, POWDER, LYOPHILIZED, FOR SOLUTION INTRAVENOUS at 10:39

## 2020-01-01 RX ADMIN — HYDROCODONE BITARTRATE AND ACETAMINOPHEN 1 TABLET: 7.5; 325 TABLET ORAL at 15:44

## 2020-01-01 RX ADMIN — LAMOTRIGINE 200 MG: 100 TABLET ORAL at 17:45

## 2020-01-01 RX ADMIN — MORPHINE SULFATE 2 MG: 2 INJECTION, SOLUTION INTRAMUSCULAR; INTRAVENOUS at 06:13

## 2020-01-01 RX ADMIN — MORPHINE SULFATE 2 MG: 2 INJECTION, SOLUTION INTRAMUSCULAR; INTRAVENOUS at 01:32

## 2020-01-01 RX ADMIN — TRAZODONE HYDROCHLORIDE 300 MG: 50 TABLET ORAL at 21:45

## 2020-01-01 RX ADMIN — VANCOMYCIN HYDROCHLORIDE 2000 MG: 10 INJECTION, POWDER, LYOPHILIZED, FOR SOLUTION INTRAVENOUS at 11:10

## 2020-01-01 RX ADMIN — HYDROCODONE BITARTRATE AND ACETAMINOPHEN 1 TABLET: 7.5; 325 TABLET ORAL at 04:12

## 2020-01-01 RX ADMIN — CLONAZEPAM 2 MG: 1 TABLET ORAL at 08:20

## 2020-01-01 RX ADMIN — GABAPENTIN 100 MG: 100 CAPSULE ORAL at 08:57

## 2020-01-01 RX ADMIN — RIVAROXABAN 20 MG: 20 TABLET, FILM COATED ORAL at 16:18

## 2020-01-01 RX ADMIN — HYDROCODONE BITARTRATE AND ACETAMINOPHEN 1 TABLET: 7.5; 325 TABLET ORAL at 23:26

## 2020-01-01 RX ADMIN — SPIRONOLACTONE 50 MG: 25 TABLET ORAL at 09:14

## 2020-01-01 RX ADMIN — MORPHINE SULFATE 2 MG: 2 INJECTION, SOLUTION INTRAMUSCULAR; INTRAVENOUS at 22:43

## 2020-01-01 RX ADMIN — TRAZODONE HYDROCHLORIDE 300 MG: 50 TABLET ORAL at 21:31

## 2020-01-01 RX ADMIN — HYDROCODONE BITARTRATE AND ACETAMINOPHEN 1 TABLET: 7.5; 325 TABLET ORAL at 16:08

## 2020-01-01 RX ADMIN — SODIUM CHLORIDE 100 ML/HR: 900 INJECTION, SOLUTION INTRAVENOUS at 16:02

## 2020-01-01 RX ADMIN — LEVOTHYROXINE SODIUM 112 MCG: 0.11 TABLET ORAL at 05:13

## 2020-01-01 RX ADMIN — GABAPENTIN 100 MG: 100 CAPSULE ORAL at 20:39

## 2020-01-01 RX ADMIN — PIPERACILLIN SODIUM AND TAZOBACTAM SODIUM 3.38 G: 3; .375 INJECTION, POWDER, LYOPHILIZED, FOR SOLUTION INTRAVENOUS at 17:18

## 2020-01-01 RX ADMIN — Medication 10 ML: at 21:54

## 2020-01-01 RX ADMIN — HYDROCODONE BITARTRATE AND ACETAMINOPHEN 1 TABLET: 7.5; 325 TABLET ORAL at 08:43

## 2020-01-01 RX ADMIN — TRAZODONE HYDROCHLORIDE 300 MG: 50 TABLET ORAL at 22:44

## 2020-01-01 RX ADMIN — POTASSIUM CHLORIDE 40 MEQ: 1500 TABLET, EXTENDED RELEASE ORAL at 13:22

## 2020-01-01 RX ADMIN — PANTOPRAZOLE SODIUM 40 MG: 40 TABLET, DELAYED RELEASE ORAL at 06:00

## 2020-01-01 RX ADMIN — HYDRALAZINE HYDROCHLORIDE 10 MG: 20 INJECTION, SOLUTION INTRAMUSCULAR; INTRAVENOUS at 20:37

## 2020-01-01 RX ADMIN — TAMSULOSIN HYDROCHLORIDE 0.4 MG: 0.4 CAPSULE ORAL at 09:14

## 2020-01-01 RX ADMIN — ATORVASTATIN CALCIUM 10 MG: 10 TABLET, FILM COATED ORAL at 08:35

## 2020-01-01 RX ADMIN — PANTOPRAZOLE SODIUM 40 MG: 40 TABLET, DELAYED RELEASE ORAL at 15:44

## 2020-01-01 RX ADMIN — GABAPENTIN 100 MG: 100 CAPSULE ORAL at 15:44

## 2020-01-01 RX ADMIN — Medication 100 MG: at 09:15

## 2020-01-01 RX ADMIN — LABETALOL HYDROCHLORIDE 10 MG: 5 INJECTION INTRAVENOUS at 20:50

## 2020-01-01 RX ADMIN — TUBERCULIN PURIFIED PROTEIN DERIVATIVE 5 UNITS: 5 INJECTION, SOLUTION INTRADERMAL at 15:17

## 2020-01-01 RX ADMIN — GABAPENTIN 100 MG: 100 CAPSULE ORAL at 21:33

## 2020-01-01 RX ADMIN — POLYETHYLENE GLYCOL 3350 17 G: 17 POWDER, FOR SOLUTION ORAL at 14:47

## 2020-01-01 RX ADMIN — CLONAZEPAM 4 MG: 1 TABLET ORAL at 21:45

## 2020-01-01 RX ADMIN — PIPERACILLIN SODIUM AND TAZOBACTAM SODIUM 3.38 G: 3; .375 INJECTION, POWDER, LYOPHILIZED, FOR SOLUTION INTRAVENOUS at 10:05

## 2020-01-01 RX ADMIN — LAMOTRIGINE 200 MG: 100 TABLET ORAL at 17:17

## 2020-01-01 RX ADMIN — TAMSULOSIN HYDROCHLORIDE 0.4 MG: 0.4 CAPSULE ORAL at 09:00

## 2020-01-01 RX ADMIN — PANTOPRAZOLE SODIUM 40 MG: 40 TABLET, DELAYED RELEASE ORAL at 16:18

## 2020-01-01 RX ADMIN — HYDROCODONE BITARTRATE AND ACETAMINOPHEN 1 TABLET: 7.5; 325 TABLET ORAL at 21:45

## 2020-01-01 RX ADMIN — PANTOPRAZOLE SODIUM 40 MG: 40 TABLET, DELAYED RELEASE ORAL at 17:44

## 2020-01-01 RX ADMIN — SODIUM CHLORIDE 1000 ML: 900 INJECTION, SOLUTION INTRAVENOUS at 14:00

## 2020-01-01 RX ADMIN — HYDROCODONE BITARTRATE AND ACETAMINOPHEN 1 TABLET: 7.5; 325 TABLET ORAL at 08:25

## 2020-01-01 RX ADMIN — SODIUM HYPOCHLORITE: 1.25 SOLUTION TOPICAL at 10:07

## 2020-01-01 RX ADMIN — TAMSULOSIN HYDROCHLORIDE 0.4 MG: 0.4 CAPSULE ORAL at 09:05

## 2020-01-01 RX ADMIN — CARVEDILOL 6.25 MG: 6.25 TABLET, FILM COATED ORAL at 08:35

## 2020-01-01 RX ADMIN — PIPERACILLIN SODIUM AND TAZOBACTAM SODIUM 3.38 G: 3; .375 INJECTION, POWDER, LYOPHILIZED, FOR SOLUTION INTRAVENOUS at 01:32

## 2020-01-01 RX ADMIN — PANTOPRAZOLE SODIUM 40 MG: 40 TABLET, DELAYED RELEASE ORAL at 06:05

## 2020-01-01 RX ADMIN — LAMOTRIGINE 200 MG: 100 TABLET ORAL at 18:08

## 2020-01-01 RX ADMIN — LAMOTRIGINE 200 MG: 100 TABLET ORAL at 08:56

## 2020-01-01 RX ADMIN — MORPHINE SULFATE 2 MG: 2 INJECTION, SOLUTION INTRAMUSCULAR; INTRAVENOUS at 22:25

## 2020-01-01 RX ADMIN — HYDROCHLOROTHIAZIDE: 12.5 CAPSULE ORAL at 22:43

## 2020-01-01 RX ADMIN — SPIRONOLACTONE 50 MG: 25 TABLET ORAL at 09:00

## 2020-01-01 RX ADMIN — ATORVASTATIN CALCIUM 10 MG: 10 TABLET, FILM COATED ORAL at 09:04

## 2020-01-01 RX ADMIN — CARVEDILOL 6.25 MG: 6.25 TABLET, FILM COATED ORAL at 09:15

## 2020-01-01 RX ADMIN — HYDROCODONE BITARTRATE AND ACETAMINOPHEN 1 TABLET: 7.5; 325 TABLET ORAL at 19:45

## 2020-01-01 RX ADMIN — PIPERACILLIN SODIUM AND TAZOBACTAM SODIUM 3.38 G: 3; .375 INJECTION, POWDER, LYOPHILIZED, FOR SOLUTION INTRAVENOUS at 09:00

## 2020-01-01 RX ADMIN — HYDROCODONE BITARTRATE AND ACETAMINOPHEN 1 TABLET: 5; 325 TABLET ORAL at 06:04

## 2020-01-01 RX ADMIN — VANCOMYCIN HYDROCHLORIDE 1500 MG: 10 INJECTION, POWDER, LYOPHILIZED, FOR SOLUTION INTRAVENOUS at 11:46

## 2020-01-01 RX ADMIN — LEVOTHYROXINE SODIUM 112 MCG: 0.11 TABLET ORAL at 06:05

## 2020-01-01 RX ADMIN — CARVEDILOL 6.25 MG: 6.25 TABLET, FILM COATED ORAL at 17:44

## 2020-01-01 RX ADMIN — CLONAZEPAM 2 MG: 1 TABLET ORAL at 08:35

## 2020-01-01 RX ADMIN — PIPERACILLIN SODIUM AND TAZOBACTAM SODIUM 3.38 G: 3; .375 INJECTION, POWDER, LYOPHILIZED, FOR SOLUTION INTRAVENOUS at 01:24

## 2020-01-01 RX ADMIN — SODIUM CHLORIDE 100 ML/HR: 900 INJECTION, SOLUTION INTRAVENOUS at 04:06

## 2020-01-01 RX ADMIN — HYDROCODONE BITARTRATE AND ACETAMINOPHEN 1 TABLET: 5; 325 TABLET ORAL at 19:52

## 2020-01-01 RX ADMIN — ONDANSETRON 4 MG: 2 INJECTION INTRAMUSCULAR; INTRAVENOUS at 09:03

## 2020-01-01 RX ADMIN — ARIPIPRAZOLE 15 MG: 5 TABLET ORAL at 13:19

## 2020-01-01 RX ADMIN — PIPERACILLIN SODIUM AND TAZOBACTAM SODIUM 3.38 G: 3; .375 INJECTION, POWDER, LYOPHILIZED, FOR SOLUTION INTRAVENOUS at 17:13

## 2020-01-01 RX ADMIN — POLYETHYLENE GLYCOL 3350 17 G: 17 POWDER, FOR SOLUTION ORAL at 09:13

## 2020-01-01 RX ADMIN — CLONAZEPAM 4 MG: 1 TABLET ORAL at 20:38

## 2020-01-01 RX ADMIN — HYDROCHLOROTHIAZIDE: 12.5 CAPSULE ORAL at 09:05

## 2020-01-01 RX ADMIN — SPIRONOLACTONE 50 MG: 25 TABLET ORAL at 08:20

## 2020-01-01 RX ADMIN — FINASTERIDE 5 MG: 5 TABLET, FILM COATED ORAL at 08:57

## 2020-01-01 RX ADMIN — Medication 10 ML: at 17:24

## 2020-01-01 RX ADMIN — PANTOPRAZOLE SODIUM 40 MG: 40 TABLET, DELAYED RELEASE ORAL at 05:22

## 2020-01-01 RX ADMIN — ARIPIPRAZOLE 15 MG: 5 TABLET ORAL at 09:04

## 2020-01-01 RX ADMIN — Medication 100 MG: at 08:56

## 2020-01-01 RX ADMIN — FOLIC ACID 1 MG: 1 TABLET ORAL at 09:04

## 2020-01-01 RX ADMIN — CARVEDILOL 6.25 MG: 6.25 TABLET, FILM COATED ORAL at 17:18

## 2020-01-01 RX ADMIN — RIVAROXABAN 20 MG: 20 TABLET, FILM COATED ORAL at 09:15

## 2020-01-01 RX ADMIN — CLONAZEPAM 2 MG: 1 TABLET ORAL at 09:15

## 2020-01-01 RX ADMIN — Medication 10 ML: at 06:02

## 2020-01-01 RX ADMIN — MORPHINE SULFATE 2 MG: 2 INJECTION, SOLUTION INTRAMUSCULAR; INTRAVENOUS at 17:17

## 2020-01-01 RX ADMIN — GABAPENTIN 100 MG: 100 CAPSULE ORAL at 08:20

## 2020-01-01 RX ADMIN — SODIUM HYPOCHLORITE: 1.25 SOLUTION TOPICAL at 12:42

## 2020-01-01 RX ADMIN — ONDANSETRON 4 MG: 2 INJECTION INTRAMUSCULAR; INTRAVENOUS at 02:40

## 2020-01-01 RX ADMIN — VENLAFAXINE HYDROCHLORIDE 75 MG: 37.5 CAPSULE, EXTENDED RELEASE ORAL at 08:56

## 2020-01-01 RX ADMIN — PANTOPRAZOLE SODIUM 40 MG: 40 TABLET, DELAYED RELEASE ORAL at 05:45

## 2020-01-01 RX ADMIN — PIPERACILLIN SODIUM AND TAZOBACTAM SODIUM 3.38 G: 3; .375 INJECTION, POWDER, LYOPHILIZED, FOR SOLUTION INTRAVENOUS at 18:08

## 2020-01-01 RX ADMIN — LAMOTRIGINE 200 MG: 100 TABLET ORAL at 09:05

## 2020-01-01 RX ADMIN — HYDRALAZINE HYDROCHLORIDE 10 MG: 20 INJECTION, SOLUTION INTRAMUSCULAR; INTRAVENOUS at 00:00

## 2020-01-01 RX ADMIN — CARVEDILOL 3.12 MG: 3.12 TABLET, FILM COATED ORAL at 17:12

## 2020-01-01 RX ADMIN — HYDROCHLOROTHIAZIDE: 12.5 CAPSULE ORAL at 09:13

## 2020-01-01 RX ADMIN — TRAZODONE HYDROCHLORIDE 300 MG: 50 TABLET ORAL at 20:39

## 2020-01-01 RX ADMIN — FOLIC ACID 1 MG: 1 TABLET ORAL at 09:15

## 2020-01-01 RX ADMIN — VENLAFAXINE HYDROCHLORIDE 75 MG: 37.5 CAPSULE, EXTENDED RELEASE ORAL at 08:34

## 2020-01-01 RX ADMIN — TRAZODONE HYDROCHLORIDE 300 MG: 50 TABLET ORAL at 21:33

## 2020-01-01 RX ADMIN — HYDROCHLOROTHIAZIDE: 12.5 CAPSULE ORAL at 08:35

## 2020-01-01 RX ADMIN — CLONAZEPAM 4 MG: 1 TABLET ORAL at 22:43

## 2020-01-01 RX ADMIN — SODIUM CHLORIDE 100 ML/HR: 900 INJECTION, SOLUTION INTRAVENOUS at 11:57

## 2020-01-01 RX ADMIN — VANCOMYCIN HYDROCHLORIDE 1500 MG: 10 INJECTION, POWDER, LYOPHILIZED, FOR SOLUTION INTRAVENOUS at 22:25

## 2020-01-01 RX ADMIN — PANTOPRAZOLE SODIUM 40 MG: 40 TABLET, DELAYED RELEASE ORAL at 16:05

## 2020-01-01 RX ADMIN — LAMOTRIGINE 200 MG: 100 TABLET ORAL at 09:13

## 2020-01-01 RX ADMIN — HYDROCODONE BITARTRATE AND ACETAMINOPHEN 1 TABLET: 7.5; 325 TABLET ORAL at 02:22

## 2020-01-01 RX ADMIN — GABAPENTIN 100 MG: 100 CAPSULE ORAL at 21:45

## 2020-01-01 RX ADMIN — ONDANSETRON 4 MG: 2 INJECTION INTRAMUSCULAR; INTRAVENOUS at 08:59

## 2020-01-01 RX ADMIN — SODIUM HYPOCHLORITE: 1.25 SOLUTION TOPICAL at 09:16

## 2020-01-01 RX ADMIN — PANTOPRAZOLE SODIUM 40 MG: 40 TABLET, DELAYED RELEASE ORAL at 17:12

## 2020-01-01 RX ADMIN — LEVOTHYROXINE SODIUM 112 MCG: 0.11 TABLET ORAL at 06:00

## 2020-01-01 RX ADMIN — CARVEDILOL 3.12 MG: 3.12 TABLET, FILM COATED ORAL at 16:05

## 2020-01-01 RX ADMIN — SODIUM CHLORIDE 100 ML/HR: 900 INJECTION, SOLUTION INTRAVENOUS at 16:00

## 2020-01-01 RX ADMIN — PIPERACILLIN SODIUM AND TAZOBACTAM SODIUM 3.38 G: 3; .375 INJECTION, POWDER, LYOPHILIZED, FOR SOLUTION INTRAVENOUS at 01:12

## 2020-01-01 RX ADMIN — GABAPENTIN 100 MG: 100 CAPSULE ORAL at 21:31

## 2020-01-01 RX ADMIN — VENLAFAXINE HYDROCHLORIDE 75 MG: 37.5 CAPSULE, EXTENDED RELEASE ORAL at 09:05

## 2020-01-01 RX ADMIN — ATORVASTATIN CALCIUM 10 MG: 10 TABLET, FILM COATED ORAL at 09:16

## 2020-01-01 RX ADMIN — Medication 5 ML: at 16:20

## 2020-01-01 RX ADMIN — FOLIC ACID 1 MG: 1 TABLET ORAL at 08:20

## 2020-01-01 RX ADMIN — SODIUM CHLORIDE 100 ML/HR: 900 INJECTION, SOLUTION INTRAVENOUS at 21:30

## 2020-01-01 RX ADMIN — ARIPIPRAZOLE 15 MG: 5 TABLET ORAL at 08:19

## 2020-01-01 RX ADMIN — ATORVASTATIN CALCIUM 10 MG: 10 TABLET, FILM COATED ORAL at 08:20

## 2020-06-24 PROBLEM — Z98.890 S/P LUMBAR LAMINECTOMY: Status: ACTIVE | Noted: 2019-10-21

## 2020-06-24 PROBLEM — M48.062 LUMBAR STENOSIS WITH NEUROGENIC CLAUDICATION: Status: ACTIVE | Noted: 2019-10-08

## 2020-06-24 PROBLEM — E66.01 OBESITY, MORBID (HCC): Status: ACTIVE | Noted: 2020-01-01

## 2020-06-24 PROBLEM — G47.33 OSA (OBSTRUCTIVE SLEEP APNEA): Status: ACTIVE | Noted: 2019-06-18

## 2020-06-24 PROBLEM — I10 ESSENTIAL HYPERTENSION: Status: ACTIVE | Noted: 2017-08-19

## 2020-06-24 PROBLEM — E29.1 HYPOGONADISM IN MALE: Status: ACTIVE | Noted: 2020-01-01

## 2020-06-24 PROBLEM — E11.9 CONTROLLED TYPE 2 DIABETES MELLITUS WITHOUT COMPLICATION, WITHOUT LONG-TERM CURRENT USE OF INSULIN (HCC): Status: ACTIVE | Noted: 2020-01-01

## 2020-06-24 PROBLEM — K21.00 GASTROESOPHAGEAL REFLUX DISEASE WITH ESOPHAGITIS: Status: ACTIVE | Noted: 2017-08-19

## 2020-06-24 PROBLEM — F31.9 BIPOLAR DISORDER (HCC): Status: ACTIVE | Noted: 2017-08-19

## 2020-06-24 PROBLEM — E03.9 HYPOTHYROIDISM: Status: ACTIVE | Noted: 2017-08-19

## 2020-08-11 PROBLEM — F10.20 ALCOHOLISM (HCC): Status: ACTIVE | Noted: 2020-01-01

## 2020-09-22 PROBLEM — I26.99 MULTIPLE PULMONARY EMBOLI (HCC): Status: ACTIVE | Noted: 2020-01-01

## 2020-10-31 PROBLEM — L03.311 CELLULITIS OF ABDOMINAL WALL: Status: ACTIVE | Noted: 2020-01-01

## 2020-10-31 NOTE — ED TRIAGE NOTES
Ems called to home for abdominal wound. Last Sunday pt fell into a coffee table and had small wound to right lower abdomen. Seen at [facility] but states wound is getting larger, turning black and draining. VSS for EMS. No interventions by EMS.

## 2020-10-31 NOTE — ED NOTES
TRANSFER - OUT REPORT: 
 
Verbal report given to Joanna finch RN on [employer]  being transferred to 2nd floor for routine progression of care Report consisted of patients Situation, Background, Assessment and  
Recommendations(SBAR). Information from the following report(s) SBAR, ED Summary and MAR was reviewed with the receiving nurse. Lines:  
Peripheral IV 10/31/20 Right Forearm (Active) Site Assessment Clean, dry, & intact 10/31/20 1035 Phlebitis Assessment 0 10/31/20 1035 Infiltration Assessment 0 10/31/20 1035 Dressing Status Clean, dry, & intact 10/31/20 1035 Hub Color/Line Status Pink 10/31/20 1035 Opportunity for questions and clarification was provided.    
 
Patient transported with:

## 2020-10-31 NOTE — PROGRESS NOTES
10/31/20 1510 Dual Skin Pressure Injury Assessment Dual Skin Pressure Injury Assessment WDL Second Care Provider (Based on [address] [place] [address]) ASIF Lieberman Skin Integumentary Skin Integumentary (WDL) X Skin Condition/Temp Dry; Other (comment) Skin Integrity Wound (add Wound LDA) (abdomen, scabs to rigth arm and knee ) Pressure  Injury Documentation No Pressure Injury Noted-Pressure Ulcer Prevention Initiated Wound Prevention and Protection Methods Orientation of Wound Prevention Posterior Location of Wound Prevention Sacrum/Coccyx Wound Offloading (Prevention Methods) Bed, pressure reduction mattress

## 2020-10-31 NOTE — ED PROVIDER NOTES
42-year-old gentleman presents with concerns about a wound in his abdominal wall that has been present for about a week. He says about a week ago he fell onto a coffee table and initially went to a different emergency department. There he was treated with some local wound care and then followed up with his primary care doctor. The wound has gotten gradually worse over the last week. He says that he has had significant drainage from the wound. He denies any fevers or chills. He says he has only a little pain. He does have a history of diabetes but says his blood sugars have been well controlled. He denies any other injury from the fall. No other associated symptoms. Elements of this note were created using speech recognition software. As such, errors of speech recognition may be present. Past Medical History:  
Diagnosis Date  Anxiety  Chronic back pain  Colon polyps  Depression  Diabetes ([address]  GERD (gastroesophageal reflux disease)  Hemorrhoids  Hypertension  Hypothyroidism Past Surgical History:  
Procedure Laterality Date  HX BACK SURGERY  08/2019  HX CHOLECYSTECTOMY  06/2014  HX HIP REPLACEMENT Right 07/2011  HX OTHER SURGICAL  09/2016  
 cervical spine  HX OTHER SURGICAL  07/2015  
 ulnar nerve Family History:  
Problem Relation Age of Onset  Breast Cancer Mother  Heart Attack Father  Depression Father  Hypertension Father Social History Socioeconomic History  Marital status: SINGLE Spouse name: Not on file  Number of children: Not on file  Years of education: Not on file  Highest education level: Not on file Occupational History  Occupation: retired Social Needs  Financial resource strain: Not on file  Food insecurity Worry: Not on file Inability: Not on file  Transportation needs Medical: Not on file Non-medical: Not on file Tobacco Use  
  Smoking status: Never Smoker  Smokeless tobacco: Never Used Substance and Sexual Activity  Alcohol use: Yes Comment: liquor/wine regularly  Drug use: Never  Sexual activity: Not on file Lifestyle  Physical activity Days per week: Not on file Minutes per session: Not on file  Stress: Not on file Relationships  Social connections Talks on phone: Not on file Gets together: Not on file Attends Sikhism service: Not on file Active member of club or organization: Not on file Attends meetings of clubs or organizations: Not on file Relationship status: Not on file  Intimate partner violence Fear of current or ex partner: Not on file Emotionally abused: Not on file Physically abused: Not on file Forced sexual activity: Not on file Other Topics Concern [address] Service Not Asked  Blood Transfusions Not Asked  Caffeine Concern No  
  Comment: 2 cups per day  Occupational Exposure Not Asked [place] Hazards Not Asked  Sleep Concern Not Asked  Stress Concern Not Asked  Weight Concern Not Asked  Special Diet Not Asked  Back Care Not Asked  Exercise Yes Comment: 3-5 days per week  Bike Helmet Not Asked  Seat Belt Not Asked  Self-Exams Not Asked Social History Narrative  Not on file ALLERGIES: Other plant, animal, environmental 
 
Review of Systems Constitutional: Negative for chills, diaphoresis and fever. HENT: Negative for congestion, rhinorrhea and sore throat. Eyes: Negative for redness and visual disturbance. Respiratory: Negative for cough, chest tightness, shortness of breath and wheezing. Cardiovascular: Negative for chest pain and palpitations. Gastrointestinal: Negative for abdominal pain, blood in stool, diarrhea, nausea and vomiting. Musculoskeletal: Negative for arthralgias, myalgias and neck stiffness. Skin: Positive for color change and wound. Negative for rash. Allergic/Immunologic: Negative for environmental allergies and food allergies. Vitals:  
 10/31/20 9634 BP: (!) 162/93 Pulse: 89 Resp: 16 Temp: 99.1 °F (37.3 °C) SpO2: 95% Weight: 136.1 kg (300 lb) Height: 6' (1.829 m) Physical Exam 
Vitals signs and nursing note reviewed. Constitutional:   
   Appearance: Normal appearance. Cardiovascular:  
   Rate and Rhythm: Normal rate and regular rhythm. Pulmonary:  
   Effort: Pulmonary effort is normal.  
   Breath sounds: Normal breath sounds. Abdominal:  
   General: Bowel sounds are normal.  
   Palpations: Abdomen is soft. Skin: 
   Comments: Cellulitis and infection surrounding the large wound. Please see the picture for details of the wound. Neurological:  
   General: No focal deficit present. Mental Status: He is alert and oriented to person, place, and time. MDM Number of Diagnoses or Management Options Diagnosis management comments: We will do a sepsis evaluation and. He has on doxycycline but I will empirically start some Zosyn and Vanco. 
 
ED Course as of Oct 31 1224 Sat Oct 31, 2020  
1106 Patient's blood work is unremarkable. I will discussed the case with surgery. [AC] 1223 I spoke with the hospitalist who kindly agreed to see the patient.  
 [facility] ED Course User Index [AC] Elton Ramirez MD  
 
 
Procedures

## 2020-10-31 NOTE — PROGRESS NOTES
Pharmacokinetic Consult to Pharmacist 
 
Katelyn Garrido. is a 60 y.o. male being treated for SSTI with vancomycin. Height: 6' (182.9 cm)  Weight: 136.1 kg (300 lb) Lab Results Component Value Date/Time BUN 15 10/31/2020 10:04 AM  
 Creatinine 1.06 10/31/2020 10:04 AM  
 WBC 9.8 10/31/2020 10:04 AM  
 Procalcitonin <0.05 10/31/2020 10:04 AM  
 Lactic acid 1.0 10/31/2020 10:04 AM  
  
Estimated Creatinine Clearance: 109.8 mL/min (based on SCr of 1.06 mg/dL). Day 1 of vancomycin. Goal trough is 10-20. Vancomycin dose initiated at 2000 mg X 1, then 1500 mg Q12H. Will continue to follow patient and order levels when clinically indicated. Thank you, 
Parvin Dang, PharmD, [facility] Clinical Pharmacist 
[phone]

## 2020-10-31 NOTE — CONSULTS
H&P/Consult Note/Progress Note/Office Note:  
Jasper Cheatham MRN: [id number]  HOP:[birth year]  Age:55 y.o. General Surgery Consult ordered by: Dr. Kellye Quinones Reason for General Surgery Consult: Eval abd wound for debridement HPI: Jasper Cheatham is a 60 y.o. male with a past medical hx of Diabetes, GERD, HTN, hypothyroidism, anxiety, and chronic back pain who presented to the ED 10/31/20 due to concerns about a wound on his abdomen. Pt reports the wound occurred about a week ago due to a fall injury. Pt states he was treated after the fall at a different emergency department and has since followed up with his PCP. Pt feels the wound has gotten worse and has a significant amount of drainage. Pt has been taking doxycycline. WBC 9.8, Lactic Acid 1.0. On Xarelto - last dose 10/30/20 Past Medical History:  
Diagnosis Date  Anxiety  Chronic back pain  Colon polyps  Depression  Diabetes ([address]  GERD (gastroesophageal reflux disease)  Hemorrhoids  Hypertension  Hypothyroidism Past Surgical History:  
Procedure Laterality Date  HX BACK SURGERY  08/2019  HX CHOLECYSTECTOMY  06/2014  HX HIP REPLACEMENT Right 07/2011  HX OTHER SURGICAL  09/2016  
 cervical spine  HX OTHER SURGICAL  07/2015  
 ulnar nerve Current Facility-Administered Medications Medication Dose Route Frequency  sodium chloride 0.9 % bolus infusion 1,000 mL  1,000 mL IntraVENous ONCE  
 0.9% sodium chloride infusion  125 mL/hr IntraVENous CONTINUOUS  
 vancomycin (VANCOCIN) 2000 mg in  ml infusion  2,000 mg IntraVENous ONCE Current Outpatient Medications Medication Sig  
 doxycycline (VIBRAMYCIN) 100 mg capsule Take 1 Cap by mouth two (2) times a day.  folic acid (FOLVITE) 1 mg tablet TAKE 1 TABLET BY MOUTH EVERY DAY  Klor-Con M20 20 mEq tablet TAKE 1 TABLET BY MOUTH THREE TIMES A DAY  rivaroxaban (Xarelto) 15 mg tab tablet Take  by mouth daily.  carvediloL (COREG) 6.25 mg tablet Take 1 Tab by mouth two (2) times daily (with meals).  doxycycline (VIBRAMYCIN) 100 mg capsule Take 1 Cap by mouth two (2) times a day.  naltrexone microspheres (VIVITROL) 380 mg ER injection 380 mg by IntraMUSCular route now for 1 dose. Given today in office - right glute - medication shipped to office from pt's pharmacy. - pt supplied  Indications: alcoholism  rivaroxaban (Xarelto) 20 mg tab tablet Take 1 Tab by mouth daily (with breakfast).  testosterone cypionate (DEPOTESTOTERONE CYPIONATE) 200 mg/mL injection 1 ML BY INTRAMUSCULAR ROUTE EVERY MONTH. MAX DAILY AMOUNT: 200 MG.  atorvastatin (LIPITOR) 10 mg tablet Take 1 Tab by mouth daily.  clonazePAM (KlonoPIN) 2 mg tablet Take 2 mg by mouth three (3) times daily.  Latuda 80 mg tab tablet Take 80 mg by mouth daily (with breakfast).  traZODone (DESYREL) 150 mg tablet Take 2 Tabs by mouth nightly.  levothyroxine (SYNTHROID) 112 mcg tablet Take 1 Tab by mouth Daily (before breakfast).  olmesartan-hydroCHLOROthiazide (BENICAR HCT) 40-12.5 mg per tablet Take 1 Tab by mouth daily.  omeprazole (PRILOSEC) 20 mg capsule Take 1 Cap by mouth two (2) times a day.  finasteride (PROSCAR) 5 mg tablet Take 1 Tab by mouth daily.  tamsulosin (FLOMAX) 0.4 mg capsule Take 1 Cap by mouth daily.  Syringe with Needle, Disp, (BD Luer-Taras Syringe) 3 mL 18 x 1 1/2\" syrg Use as directed  Needle, Disp, 22 G 22 gauge x 1\" ndle Use as directed  dapagliflozin (Farxiga) 10 mg tab tablet Take 1 Tab by mouth daily.  lamoTRIgine (LAMICTAL) 200 mg tablet Take 200 mg by mouth two (2) times a day.  venlafaxine-SR (EFFEXOR-XR) 75 mg capsule Take 75 mg by mouth daily. Other plant, animal, environmental 
Social History Socioeconomic History  Marital status: SINGLE Spouse name: Not on file  Number of children: Not on file  Years of education: Not on file  Highest education level: Not on file Occupational History  Occupation: retired Tobacco Use  Smoking status: Never Smoker  Smokeless tobacco: Never Used Substance and Sexual Activity  Alcohol use: Yes Comment: liquor/wine regularly  Drug use: Never Other Topics Concern  Caffeine Concern No  
  Comment: 2 cups per day  Exercise Yes Comment: 3-5 days per week Social History Tobacco Use Smoking Status Never Smoker Smokeless Tobacco Never Used Family History Problem Relation Age of Onset  Breast Cancer Mother  Heart Attack Father  Depression Father  Hypertension Father ROS: The patient has no difficulty with chest pain or shortness of breath. No fever or chills. Comprehensive review of systems was otherwise unremarkable except as noted above. Physical Exam:  
Visit Vitals BP (!) 162/93 (BP 1 Location: Left arm, BP Patient Position: At rest) Pulse 89 Temp 99.1 °F (37.3 °C) Resp 16 Ht 6' (1.829 m) Wt 300 lb (136.1 kg) SpO2 95% BMI 40.69 kg/m² Vitals:  
 10/31/20 9990 BP: (!) 162/93 Pulse: 89 Resp: 16 Temp: 99.1 °F (37.3 °C) SpO2: 95% Weight: 300 lb (136.1 kg) Height: 6' (1.829 m) No intake/output data recorded. No intake/output data recorded. Constitutional: Alert, cooperative, NAD Eyes: Sclera are clear. EOMs intact ENMT: no external lesions gross hearing normal; no obvious neck masses, no ear or lip lesions, nares normal 
CV: RRR. Normal perfusion Resp: No JVD. Breathing is  non-labored; no audible wheezing. GI: Obese, soft and non-distended, Abd Wound Length approx 5cm x Width approx  6cm x depth approx 4cm. Some surrounding erythema, slight odor, + drainage with small amount of necrotic tissue at wound edges. Integument: abrasion on Right knee Musculoskeletal: unremarkable with normal function. No embolic signs or cyanosis. Neuro:  Oriented; moves all 4; no focal deficits Psychiatric: normal affect and mood, no memory impairment Recent vitals (if inpt): 
Patient Vitals for the past 24 hrs: 
 BP Temp Pulse Resp SpO2 Height Weight 10/31/20 0836 (!) 162/93 99.1 °F (37.3 °C) 89 16 95 % 6' (1.829 m) 300 lb (136.1 kg) Labs: 
Recent Labs 10/31/20 
1004 WBC 9.8 HGB 11.7*    
K 4.0  
 CO2 26 BUN 15  
CREA 1.06  
* TBILI 0.6 ALT 75*  Lab Results Component Value Date/Time WBC 9.8 10/31/2020 10:04 AM  
 HGB 11.7 (L) 10/31/2020 10:04 AM  
 PLATELET 092 85/56/3971 10:04 AM  
 Sodium 139 10/31/2020 10:04 AM  
 Potassium 4.0 10/31/2020 10:04 AM  
 Chloride 104 10/31/2020 10:04 AM  
 CO2 26 10/31/2020 10:04 AM  
 BUN 15 10/31/2020 10:04 AM  
 Creatinine 1.06 10/31/2020 10:04 AM  
 Glucose 109 (H) 10/31/2020 10:04 AM  
 Bilirubin, total 0.6 10/31/2020 10:04 AM  
 Bilirubin, direct 0.12 08/13/2020 07:52 AM  
 ALT (SGPT) 75 (H) 10/31/2020 10:04 AM  
 Alk. phosphatase 100 10/31/2020 10:04 AM  
 
 
CT Results  (Last 48 hours) None  
  
 
chest X-ray I reviewed recent labs, recent radiologic studies, and pertinent records including other doctor notes if needed. I independently reviewed radiology images for studies I described above or studies I have ordered. Admission date (for inpatients): 10/31/2020 * No surgery found *  * No surgery found * ASSESSMENT/PLAN: 
Problem List  Date Reviewed: 10/7/2020 Codes Class Noted Multiple pulmonary emboli [facility]) ICD-10-CM: I26.99 
ICD-9-CM: 415.19  9/14/2020 Overview Signed 9/22/2020  9:02 AM by Shola Thompson MD  
  Last Assessment & Plan:  
Presents with rapid HR and weakness. No CP/SOB. CTA showing bilateral pulmonary emboli. Flat troponin. Initially heparin and now [address] Echo normal. 
Does not needs specific cardiology f/u at this time. Alcoholism ([facility] ICD-10-CM: G39.53 ICD-9-CM: 303.90  8/11/2020 Obesity, morbid ([facility] ICD-10-CM: E66.01 
ICD-9-CM: 278.01  6/24/2020 Hypogonadism in male ICD-10-CM: E29.1 ICD-9-CM: 257.2  6/24/2020 Controlled type 2 diabetes mellitus without complication, without long-term current use of insulin ([facility] ICD-10-CM: E11.9 ICD-9-CM: 250.00  6/24/2020 S/P lumbar laminectomy ICD-10-CM: O13.308 ICD-9-CM: V45.89  10/21/2019 Overview Signed 6/24/2020  7:21 PM by Demetris Cuello MD  
  Last Assessment & Plan:  
61 yo s/p right L 3-4, 4-5 decompression. His wound looks fine. I removed his staples today. His right leg pain is much better. I am going to start home exercises. Lumbar stenosis with neurogenic claudication ICD-10-CM: Y67.615 
ICD-9-CM: 724.03  10/8/2019 NATASHA (obstructive sleep apnea) ICD-10-CM: G47.33 
ICD-9-CM: 327.23  6/18/2019 Overview Signed 6/24/2020  7:21 PM by Demetris Cuello MD  
  Diagnosed elsewhere along 2005. On auto titrating CPAP 8-17 cm. DME: Sandra Ritter. Last Assessment & Plan:  
Patient is maintaining excellent compliance with PAP therapy and gaining benefit from it. Sleep quality is better. No difficulty with tolerance. Patient is encouraged to maintain compliance. Discussed sleep hygiene measures. Discussed importance of weight reduction goals and impact of weight reduction on treating sleep apnea. Prescription for PAP supplies was renewed. Follow-up in 6 months Bipolar disorder ([facility] ICD-10-CM: F31.9 ICD-9-CM: 296.80  8/19/2017 Overview Signed 6/24/2020  7:21 PM by Demetris Cuello MD  
  Last Assessment & Plan:  
Effexor Trazodone Essential hypertension ICD-10-CM: I10 
ICD-9-CM: 401.9  8/19/2017 Overview Signed 6/24/2020  7:21 PM by Demetris Cuello MD  
  Last Assessment & Plan:  
BPs in low normal range. -BPs continue to be normal range, not on home meds Gastroesophageal reflux disease with esophagitis ICD-10-CM: K21.00 ICD-9-CM: 530.11  8/19/2017 Overview Signed 6/24/2020  7:21 PM by Geovanny Barrera MD  
  Last Assessment & Plan:  
Protonix BID Hypothyroidism ICD-10-CM: E03.9 ICD-9-CM: 244.9  8/19/2017 Overview Signed 6/24/2020  7:21 PM by Geovanny Barrera MD  
  Last Assessment & Plan:  
Continue levothyroxine Check TSH Active Problems: * No active hospital problems. * 
  
 
Plan: 
Care Management per Hospitalist 
Blood cultures done in ED 
IV abx Recommend BID wet to dry dressing changes No indication for surgical debridement at this time Available if needed Lizeth Riley NP

## 2020-10-31 NOTE — PROGRESS NOTES
END OF SHIFT NOTE: 
 
INTAKE/OUTPUT No intake/output data recorded. Voiding: YES Catheter: NO 
Drain:   
 
 
 
 
 
Flatus: Patient does have flatus present. Stool:  0 occurrences. Characteristics: 
  
 
Emesis: 0 occurrences. Characteristics: VITAL SIGNS Patient Vitals for the past 12 hrs: 
 Temp Pulse Resp BP SpO2  
10/31/20 1527 98.5 °F (36.9 °C) 74 18 (!) 158/84 96 % 10/31/20 1419  78   94 % 10/31/20 1359  66  133/79 91 % 10/31/20 1328  70  (!) 144/81 96 % 10/31/20 1258  71  122/68 92 % 10/31/20 1228  65  118/67 93 % 10/31/20 1159  71  113/61 93 % 10/31/20 1128  74  124/73 93 % 10/31/20 1113  75  103/69 94 % 10/31/20 0836 99.1 °F (37.3 °C) 89 16 (!) 162/93 95 % Pain Assessment Pain Intensity 1: 0 (10/31/20 1510) Patient Stated Pain Goal: 0 Ambulating No 
 
Shift report given to oncoming nurse at the bedside.  
 
Cinthia Jiang

## 2020-10-31 NOTE — H&P
Hospitalist Note Admit Date:  10/31/2020  8:31 AM  
Name:  Victoria Stout. Age:  60 y.o. 
:  1965 MRN:  [id number] PCP:  Alexandru Sanz MD 
Treatment Team: Attending Provider: Jessica Toth MD; Primary Nurse: Simeon Lee RN; Surgeon: Joe Romo MD 
 
HPI/Subjective: This is a 33-year-old male with past medical history significant for hypertension, non-insulin-dependent diabetes mellitus type 2, hypothyroidism, anxiety, bipolar disorder presented to the ER because of wound on his abdomen since 1 week. Patient states that he has been doing fairly well until few days ago when he was fixing his new sofa and fell onto a coffee table and hurt his abdomen. No visible bleeding. No fever. He reports some chills. No nausea no vomiting. He was seen in outside ER and was given Tetanus shot and prescribed doxycycline. Followed up with his primary care doctor and the wound has been progressively worsening over the last week. He denies any chest pain cough or shortness of breath. 10 systems reviewed and negative except as noted in HPI. Patient admitted for further evaluation management of abdominal wound. Past Medical History:  
Diagnosis Date  Anxiety  Chronic back pain  Colon polyps  Depression  Diabetes ([address]  GERD (gastroesophageal reflux disease)  Hemorrhoids  Hypertension  Hypothyroidism Past Surgical History:  
Procedure Laterality Date  HX BACK SURGERY  2019  HX CHOLECYSTECTOMY  2014  HX HIP REPLACEMENT Right 2011  HX OTHER SURGICAL  2016  
 cervical spine  HX OTHER SURGICAL  2015  
 ulnar nerve Allergies Allergen Reactions  Other Plant, Animal, Environmental Unknown (comments) Seasonal allergies (ragweed, etc.) Social History Tobacco Use  Smoking status: Never Smoker  Smokeless tobacco: Never Used Substance Use Topics  Alcohol use:  Yes  
 Comment: liquor/wine regularly Family History Problem Relation Age of Onset  Breast Cancer Mother  Heart Attack Father  Depression Father  Hypertension Father Immunization History Administered Date(s) Administered  Influenza Vaccine BioPheresis) PF (>6 Mo Flulaval, Fluarix, and >3 Yrs Senath, Fluzone 06123) 09/22/2020  Td 01/01/2018  Tdap 01/01/2018 PTA Medications: 
Prior to Admission Medications Prescriptions Last Dose Informant Patient Reported? Taking? Klor-Con M20 20 mEq tablet   No Yes Sig: TAKE 1 TABLET BY MOUTH THREE TIMES A DAY Latuda 80 mg tab tablet   Yes Yes Sig: Take 80 mg by mouth daily (with breakfast). Needle, Disp, 22 G 22 gauge x 1\" ndle   No No  
Sig: Use as directed Syringe with Needle, Disp, (BD Luer-Taras Syringe) 3 mL 18 x 1 1/2\" syrg   No Yes Sig: Use as directed  
atorvastatin (LIPITOR) 10 mg tablet   No Yes Sig: Take 1 Tab by mouth daily. carvediloL (COREG) 3.125 mg tablet   Yes Yes Sig: Take 3.125 mg by mouth two (2) times daily (with meals). clonazePAM (KlonoPIN) 2 mg tablet   Yes No  
Sig: Take 2 mg by mouth three (3) times daily. Pt taking 2mg in am and 4 mg qHS  
dapagliflozin (Farxiga) 10 mg tab tablet   No Yes Sig: Take 1 Tab by mouth daily. doxycycline (VIBRAMYCIN) 100 mg capsule   No Yes Sig: Take 1 Cap by mouth two (2) times a day. finasteride (PROSCAR) 5 mg tablet   No Yes Sig: Take 1 Tab by mouth daily. folic acid (FOLVITE) 1 mg tablet   No No  
Sig: TAKE 1 TABLET BY MOUTH EVERY DAY  
gabapentin (NEURONTIN) 100 mg capsule   Yes Yes Sig: Take  by mouth three (3) times daily. lamoTRIgine (LAMICTAL) 200 mg tablet   Yes Yes Sig: Take 200 mg by mouth two (2) times a day. levothyroxine (SYNTHROID) 112 mcg tablet   No Yes Sig: Take 1 Tab by mouth Daily (before breakfast).   
naltrexone microspheres (VIVITROL) 380 mg ER injection   No No  
 Si mg by IntraMUSCular route now for 1 dose. Given today in office - right glute - medication shipped to office from pt's pharmacy. - pt supplied  Indications: alcoholism  
olmesartan-hydroCHLOROthiazide (BENICAR HCT) 40-12.5 mg per tablet   No Yes Sig: Take 1 Tab by mouth daily. omeprazole (PRILOSEC) 20 mg capsule   No Yes Sig: Take 1 Cap by mouth two (2) times a day. rivaroxaban (Xarelto) 20 mg tab tablet   No Yes Sig: Take 1 Tab by mouth daily (with breakfast). spironolactone (ALDACTONE) 50 mg tablet   Yes Yes Sig: Take 50 mg by mouth daily. tamsulosin (FLOMAX) 0.4 mg capsule   No Yes Sig: Take 1 Cap by mouth daily. testosterone cypionate (DEPOTESTOTERONE CYPIONATE) 200 mg/mL injection   No Yes Si ML BY INTRAMUSCULAR ROUTE EVERY MONTH. MAX DAILY AMOUNT: 200 MG.  
thiamine HCL (B-1) 100 mg tablet   Yes Yes Sig: Take  by mouth daily. traZODone (DESYREL) 150 mg tablet   No Yes Sig: Take 2 Tabs by mouth nightly. venlafaxine-SR (EFFEXOR-XR) 75 mg capsule   Yes Yes Sig: Take 75 mg by mouth daily. Facility-Administered Medications: None Objective:  
 
Patient Vitals for the past 24 hrs: 
 Temp Pulse Resp BP SpO2  
10/31/20 1359  66  133/79 91 % 10/31/20 1328  70  (!) 144/81 96 % 10/31/20 1258  71  122/68 92 % 10/31/20 1228  65  118/67 93 % 10/31/20 1159  71  113/61 93 % 10/31/20 1128  74  124/73 93 % 10/31/20 1113  75  103/69 94 % 10/31/20 0836 99.1 °F (37.3 °C) 89 16 (!) 162/93 95 % Oxygen Therapy O2 Sat (%): 91 % (10/31/20 1359) Pulse via Oximetry: 66 beats per minute (10/31/20 1359) O2 Device: Room air (10/31/20 0836) Intake/Output Summary (Last 24 hours) at 10/31/2020 1430 Last data filed at 10/31/2020 1400 Gross per 24 hour Intake 100 ml Output  Net 100 ml *Note that automatically entered I/Os may not be accurate; dependent on patient compliance with collection and accurate  by assistants. Physical Exam: 
General:    Well nourished. Alert. Eyes:   Normal sclerae. Extraocular movements intact. HENT:  Normocephalic, atraumatic. Moist mucous membranes CV:   RRR. No m/r/g. Lungs:  CTAB. No wheezing, rhonchi, or rales. Abdomen: Soft, large open wound in the lower abdomen, approx 5 x 6 x 4 cm deep, with foul-smelling purulent drainage with necrotic tissue, nontender, nondistended. Extremities: Warm and dry. No cyanosis or edema. Neurologic: CN II-XII grossly intact. Sensation intact. Skin:     No rashes or jaundice. Normal coloration Psych:  Normal mood and affect. I reviewed the labs, imaging, EKGs, telemetry, and other studies done this admission. Data Review:  
Recent Results (from the past 24 hour(s)) CBC WITH AUTOMATED DIFF Collection Time: 10/31/20 10:04 AM  
Result Value Ref Range WBC 9.8 4.3 - 11.1 K/uL  
 RBC 3.58 (L) 4.23 - 5.6 M/uL  
 HGB 11.7 (L) 13.6 - 17.2 g/dL HCT 34.5 (L) 41.1 - 50.3 % MCV 96.4 79.6 - 97.8 FL  
 MCH 32.7 26.1 - 32.9 PG  
 MCHC 33.9 31.4 - 35.0 g/dL  
 RDW 14.0 11.9 - 14.6 % PLATELET 444 944 - 014 K/uL MPV 9.4 9.4 - 12.3 FL ABSOLUTE NRBC 0.00 0.0 - 0.2 K/uL  
 DF AUTOMATED NEUTROPHILS 69 43 - 78 % LYMPHOCYTES 17 13 - 44 % MONOCYTES 11 4.0 - 12.0 % EOSINOPHILS 1 0.5 - 7.8 % BASOPHILS 1 0.0 - 2.0 % IMMATURE GRANULOCYTES 1 0.0 - 5.0 %  
 ABS. NEUTROPHILS 6.8 1.7 - 8.2 K/UL  
 ABS. LYMPHOCYTES 1.6 0.5 - 4.6 K/UL  
 ABS. MONOCYTES 1.1 0.1 - 1.3 K/UL  
 ABS. EOSINOPHILS 0.1 0.0 - 0.8 K/UL  
 ABS. BASOPHILS 0.1 0.0 - 0.2 K/UL  
 ABS. IMM. GRANS. 0.1 0.0 - 0.5 K/UL METABOLIC PANEL, COMPREHENSIVE Collection Time: 10/31/20 10:04 AM  
Result Value Ref Range Sodium 139 138 - 145 mmol/L Potassium 4.0 3.5 - 5.1 mmol/L Chloride 104 98 - 107 mmol/L  
 CO2 26 21 - 32 mmol/L Anion gap 9 7 - 16 mmol/L Glucose 109 (H) 65 - 100 mg/dL BUN 15 6 - 23 MG/DL  Creatinine 1.06 0.8 - 1.5 MG/DL  
 GFR est AA >60 >60 ml/min/1.73m2 GFR est non-AA >60 >60 ml/min/1.73m2 Calcium 9.1 8.3 - 10.4 MG/DL Bilirubin, total 0.6 0.2 - 1.1 MG/DL  
 ALT (SGPT) 75 (H) 12 - 65 U/L  
 AST (SGOT) 42 (H) 15 - 37 U/L Alk. phosphatase 100 50 - 136 U/L Protein, total 7.7 6.3 - 8.2 g/dL Albumin 3.3 (L) 3.5 - 5.0 g/dL Globulin 4.4 (H) 2.3 - 3.5 g/dL A-G Ratio 0.8 (L) 1.2 - 3.5 LACTIC ACID Collection Time: 10/31/20 10:04 AM  
Result Value Ref Range Lactic acid 1.0 0.4 - 2.0 MMOL/L  
PROCALCITONIN Collection Time: 10/31/20 10:04 AM  
Result Value Ref Range Procalcitonin <0.05 ng/mL All Micro Results Procedure Component Value Units Date/Time CULTURE, BLOOD [[id number]] Collected:  10/31/20 1004 Order Status:  Completed Specimen:  Blood Updated:  10/31/20 1113 CULTURE, BLOOD [[id number]] Collected:  10/31/20 1036 Order Status:  Completed Specimen:  Blood Updated:  10/31/20 1043 Current Facility-Administered Medications Medication Dose Route Frequency  0.9% sodium chloride infusion  125 mL/hr IntraVENous CONTINUOUS  
 sodium chloride (NS) flush 5-40 mL  5-40 mL IntraVENous Q8H  
 sodium chloride (NS) flush 5-40 mL  5-40 mL IntraVENous PRN  
 0.9% sodium chloride infusion  100 mL/hr IntraVENous CONTINUOUS  
 HYDROcodone-acetaminophen (NORCO) 5-325 mg per tablet 1 Tab  1 Tab Oral Q4H PRN  
 morphine injection 2 mg  2 mg IntraVENous Q4H PRN  
 naloxone (NARCAN) injection 0.4 mg  0.4 mg IntraVENous PRN  piperacillin-tazobactam (ZOSYN) 3.375 g in 0.9% sodium chloride (MBP/ADV) 100 mL  3.375 g IntraVENous Q8H  
 [START ON 11/1/2020] atorvastatin (LIPITOR) tablet 10 mg  10 mg Oral DAILY  carvediloL (COREG) tablet 3.125 mg  3.125 mg Oral BID WITH MEALS  clonazePAM (KlonoPIN) tablet 2 mg  2 mg Oral BID  [START ON 11/1/2020] finasteride (PROSCAR) tablet 5 mg  5 mg Oral DAILY  [START ON 48/7/4979] folic acid (FOLVITE) tablet 1 mg  1 mg Oral DAILY  gabapentin (NEURONTIN) capsule 100 mg  100 mg Oral TID  lamoTRIgine (LaMICtal) tablet 200 mg  200 mg Oral BID  [START ON 11/1/2020] lurasidone (LATUDA) tablet 80 mg (Patient Supplied)  80 mg Oral DAILY WITH BREAKFAST  [START ON 11/1/2020] levothyroxine (SYNTHROID) tablet 112 mcg  112 mcg Oral ACB  pantoprazole (PROTONIX) tablet 40 mg  40 mg Oral ACB&D  [START ON 11/1/2020] spironolactone (ALDACTONE) tablet 50 mg  50 mg Oral DAILY  [START ON 11/1/2020] tamsulosin (FLOMAX) capsule 0.4 mg  0.4 mg Oral DAILY  [START ON 11/1/2020] thiamine HCL (B-1) tablet 100 mg  100 mg Oral DAILY  traZODone (DESYREL) tablet 300 mg  300 mg Oral QHS  [START ON 11/1/2020] venlafaxine-SR (EFFEXOR-XR) capsule 75 mg  75 mg Oral DAILY  insulin lispro (HUMALOG) injection   SubCUTAneous AC&HS  rivaroxaban (XARELTO) tablet 20 mg  20 mg Oral DAILY WITH BREAKFAST Current Outpatient Medications Medication Sig  carvediloL (COREG) 3.125 mg tablet Take 3.125 mg by mouth two (2) times daily (with meals).  spironolactone (ALDACTONE) 50 mg tablet Take 50 mg by mouth daily.  gabapentin (NEURONTIN) 100 mg capsule Take  by mouth three (3) times daily.  thiamine HCL (B-1) 100 mg tablet Take  by mouth daily.  doxycycline (VIBRAMYCIN) 100 mg capsule Take 1 Cap by mouth two (2) times a day.  Klor-Con M20 20 mEq tablet TAKE 1 TABLET BY MOUTH THREE TIMES A DAY  rivaroxaban (Xarelto) 20 mg tab tablet Take 1 Tab by mouth daily (with breakfast).  testosterone cypionate (DEPOTESTOTERONE CYPIONATE) 200 mg/mL injection 1 ML BY INTRAMUSCULAR ROUTE EVERY MONTH. MAX DAILY AMOUNT: 200 MG.  atorvastatin (LIPITOR) 10 mg tablet Take 1 Tab by mouth daily.  Latuda 80 mg tab tablet Take 80 mg by mouth daily (with breakfast).  traZODone (DESYREL) 150 mg tablet Take 2 Tabs by mouth nightly.  levothyroxine (SYNTHROID) 112 mcg tablet Take 1 Tab by mouth Daily (before breakfast).  olmesartan-hydroCHLOROthiazide (BENICAR HCT) 40-12.5 mg per tablet Take 1 Tab by mouth daily.  omeprazole (PRILOSEC) 20 mg capsule Take 1 Cap by mouth two (2) times a day.  finasteride (PROSCAR) 5 mg tablet Take 1 Tab by mouth daily.  tamsulosin (FLOMAX) 0.4 mg capsule Take 1 Cap by mouth daily.  Syringe with Needle, Disp, (BD Luer-Taras Syringe) 3 mL 18 x 1 1/2\" syrg Use as directed  dapagliflozin (Farxiga) 10 mg tab tablet Take 1 Tab by mouth daily.  lamoTRIgine (LAMICTAL) 200 mg tablet Take 200 mg by mouth two (2) times a day.  venlafaxine-SR (EFFEXOR-XR) 75 mg capsule Take 75 mg by mouth daily.  folic acid (FOLVITE) 1 mg tablet TAKE 1 TABLET BY MOUTH EVERY DAY  naltrexone microspheres (VIVITROL) 380 mg ER injection 380 mg by IntraMUSCular route now for 1 dose. Given today in office - right glute - medication shipped to office from pt's pharmacy. - pt supplied  Indications: alcoholism  clonazePAM (KlonoPIN) 2 mg tablet Take 2 mg by mouth three (3) times daily. Pt taking 2mg in am and 4 mg qHS  Needle, Disp, 22 G 22 gauge x 1\" ndle Use as directed Other Studies: No results found. Assessment and Plan:  
 
Hospital Problems as of 10/31/2020 Date Reviewed: 10/7/2020 Codes Class Noted - Resolved POA * (Principal) Cellulitis of abdominal wall ICD-10-CM: L03.311 ICD-9-CM: 682.2  10/31/2020 - Present Unknown Alcoholism ([facility] ICD-10-CM: K55.94 ICD-9-CM: 303.90  8/11/2020 - Present Yes Obesity, morbid ([facility] ICD-10-CM: E66.01 
ICD-9-CM: 278.01  6/24/2020 - Present Yes Controlled type 2 diabetes mellitus without complication, without long-term current use of insulin ([facility] ICD-10-CM: E11.9 ICD-9-CM: 250.00  6/24/2020 - Present Yes Bipolar disorder ([facility] ICD-10-CM: F31.9 ICD-9-CM: 296.80  8/19/2017 - Present Yes Overview Signed 6/24/2020  7:21 PM by Nisa Friend MD  
  Last Assessment & Plan:  
Effexor Trazodone Essential hypertension ICD-10-CM: I10 
ICD-9-CM: 401.9  8/19/2017 - Present Yes Overview Signed 6/24/2020  7:21 PM by Linda Marie MD  
  Last Assessment & Plan:  
BPs in low normal range. -BPs continue to be normal range, not on home meds Gastroesophageal reflux disease with esophagitis ICD-10-CM: K21.00 ICD-9-CM: 530.11  8/19/2017 - Present Yes Overview Signed 6/24/2020  7:21 PM by Linda Marie MD  
  Last Assessment & Plan:  
Protonix BID Hypothyroidism ICD-10-CM: E03.9 ICD-9-CM: 244.9  8/19/2017 - Present Yes Overview Signed 6/24/2020  7:21 PM by Linda Marie MD  
  Last Assessment & Plan:  
Continue levothyroxine Check TSH Plan: This is a 57-year-old male with Abdominal wall cellulitis/abscess status post trauma Broad-spectrum antibiotics vancomycin and Zosyn General surgery consulted. Recommendations for IV antibiotics and no surgical debridement. Wound care. Hypertension Resume home medications. Diabetes mellitus type 2 Humalog sliding scale insulin. Hypothyroidism Continue home medications levothyroxine GERD PPI Alcohol dependence Monitor for signs of alcohol withdrawal. 
 
Recent PE Continue home Xarelto. Obstructive sleep apnea CPAP. Bipolar disorder Resume home medications. Anxiety Klonopin Discharge planning: Medical/surgical inpatient DVT ppx: Xarelto Code status:  Full DPOAMarrashida Redmandebra, Ms Jen Berry [phone] Estimated LOS:  Greater than 2 midnights Risk:  high Signed: 
Mark Nunez MD

## 2020-11-01 NOTE — PROGRESS NOTES
Hospitalist Progress Note Admit Date:  10/31/2020  8:31 AM  
Name:  Jasper Francis. Age:  60 y.o. 
:  1965 MRN:  [id number] PCP:  Connie Torres MD 
Treatment Team: Attending Provider: Zulay Ricks MD 
 
Subjective:  
 
Pt is a 64-year-old male with pmh HTN, DM, hypothyroidism, anxiety, bipolar disorder who presented to the ER because of wound on his abdomen x 1 week. Pt fell into a sofa 1 week ago and wound has been worsening since. He was seen in outside ER and was given Tetanus shot and prescribed doxycycline with no improvement. Started on Vanc/Zosyn. 10/31 Surgery evaluated wound and stated non surgical tx plan. Today pt reports he feels abd wound has worsened. Additionally he states that since his fall he has had right 5th finger tingling and weakness. He also c/o right shoulder pain. Using ice pack. Objective:  
 
Patient Vitals for the past 24 hrs: 
 Temp Pulse Resp BP SpO2  
20 1146 98.8 °F (37.1 °C) 80 16 (!) 155/89 95 % 20 0744 99.3 °F (37.4 °C) 79 18 (!) 153/88 94 % 20 0224 97.7 °F (36.5 °C) 76 18 (!) 144/87 93 % 10/31/20 2200 98 °F (36.7 °C) 78 18 (!) 159/86 94 % 10/31/20 2158     96 % 10/31/20 2002 97.7 °F (36.5 °C) 78 18 (!) 156/96 93 % 10/31/20 1527 98.5 °F (36.9 °C) 74 18 (!) 158/84 96 % 10/31/20 1419  78   94 % 10/31/20 1359  66  133/79 91 % 10/31/20 1328  70  (!) 144/81 96 % 10/31/20 1258  71  122/68 92 % 10/31/20 1228  65  118/67 93 % Oxygen Therapy O2 Sat (%): 95 % (20 1146) Pulse via Oximetry: 77 beats per minute (10/31/20 2158) O2 Device: CPAP nasal (10/31/20 2158) FIO2 (%): 21 % (10/31/20 2158) Intake/Output Summary (Last 24 hours) at 2020 1204 Last data filed at 2020 0603 Gross per 24 hour Intake 1664 ml Output 1200 ml Net 464 ml General:    Well nourished. Alert. CV:   RRR. No murmur, rub, or gallop. Lungs:   CTAB. No wheezing, rhonchi, or rales. Abdomen:   Soft, nontender, nondistended. Extremities: Warm and dry. No cyanosis or edema. Skin:     No rashes or jaundice. Neuro:  No gross focal deficits Full ROM with shoulders Data Review: 
I have reviewed all labs, meds, telemetry events, and studies from the last 24 hours: 
 
Recent Results (from the past 24 hour(s)) GLUCOSE, POC Collection Time: 10/31/20  5:14 PM  
Result Value Ref Range Glucose (POC) 128 (H) 65 - 100 mg/dL GLUCOSE, POC Collection Time: 10/31/20  9:12 PM  
Result Value Ref Range Glucose (POC) 124 (H) 65 - 100 mg/dL LACTIC ACID Collection Time: 10/31/20 10:45 PM  
Result Value Ref Range Lactic acid 1.0 0.4 - 2.0 MMOL/L  
METABOLIC PANEL, COMPREHENSIVE Collection Time: 11/01/20  4:40 AM  
Result Value Ref Range Sodium 143 136 - 145 mmol/L Potassium 3.7 3.5 - 5.1 mmol/L Chloride 111 (H) 98 - 107 mmol/L  
 CO2 27 21 - 32 mmol/L Anion gap 5 (L) 7 - 16 mmol/L Glucose 92 65 - 100 mg/dL BUN 15 6 - 23 MG/DL Creatinine 0.95 0.8 - 1.5 MG/DL  
 GFR est AA >60 >60 ml/min/1.73m2 GFR est non-AA >60 >60 ml/min/1.73m2 Calcium 7.7 (L) 8.3 - 10.4 MG/DL Bilirubin, total 0.5 0.2 - 1.1 MG/DL  
 ALT (SGPT) 57 12 - 65 U/L  
 AST (SGOT) 39 (H) 15 - 37 U/L Alk. phosphatase 84 50 - 136 U/L Protein, total 6.4 6.3 - 8.2 g/dL Albumin 2.6 (L) 3.5 - 5.0 g/dL Globulin 3.8 (H) 2.3 - 3.5 g/dL A-G Ratio 0.7 (L) 1.2 - 3.5    
CBC WITH AUTOMATED DIFF Collection Time: 11/01/20  4:40 AM  
Result Value Ref Range WBC 5.6 4.3 - 11.1 K/uL  
 RBC 3.17 (L) 4.23 - 5.6 M/uL  
 HGB 10.3 (L) 13.6 - 17.2 g/dL HCT 31.1 (L) 41.1 - 50.3 % MCV 98.1 (H) 79.6 - 97.8 FL  
 MCH 32.5 26.1 - 32.9 PG  
 MCHC 33.1 31.4 - 35.0 g/dL  
 RDW 13.9 11.9 - 14.6 % PLATELET 019 297 - 830 K/uL MPV 9.0 (L) 9.4 - 12.3 FL ABSOLUTE NRBC 0.00 0.0 - 0.2 K/uL  
 DF AUTOMATED NEUTROPHILS 57 43 - 78 % LYMPHOCYTES 28 13 - 44 % MONOCYTES 11 4.0 - 12.0 % EOSINOPHILS 3 0.5 - 7.8 % BASOPHILS 1 0.0 - 2.0 % IMMATURE GRANULOCYTES 1 0.0 - 5.0 %  
 ABS. NEUTROPHILS 3.2 1.7 - 8.2 K/UL  
 ABS. LYMPHOCYTES 1.5 0.5 - 4.6 K/UL  
 ABS. MONOCYTES 0.6 0.1 - 1.3 K/UL  
 ABS. EOSINOPHILS 0.2 0.0 - 0.8 K/UL  
 ABS. BASOPHILS 0.1 0.0 - 0.2 K/UL  
 ABS. IMM. GRANS. 0.1 0.0 - 0.5 K/UL GLUCOSE, POC Collection Time: 11/01/20  8:54 AM  
Result Value Ref Range Glucose (POC) 124 (H) 65 - 100 mg/dL GLUCOSE, POC Collection Time: 11/01/20 11:44 AM  
Result Value Ref Range Glucose (POC) 142 (H) 65 - 100 mg/dL All Micro Results Procedure Component Value Units Date/Time CULTURE, BLOOD [[id number]] Collected:  10/31/20 0904 Order Status:  Completed Specimen:  Blood Updated:  11/01/20 1059 Special Requests: RIGHT ANTECUBITAL Culture result: NO GROWTH AFTER 20 HOURS     
 CULTURE, BLOOD [[id number]] Collected:  10/31/20 0936 Order Status:  Completed Specimen:  Blood Updated:  11/01/20 9297 Special Requests: --     
  RIGHT 
HAND Culture result: NO GROWTH AFTER 20 HOURS No results found for this visit on 10/31/20. Current Meds: 
Current Facility-Administered Medications Medication Dose Route Frequency  0.9% sodium chloride infusion  125 mL/hr IntraVENous CONTINUOUS  
 sodium chloride (NS) flush 5-40 mL  5-40 mL IntraVENous Q8H  
 sodium chloride (NS) flush 5-40 mL  5-40 mL IntraVENous PRN  
 0.9% sodium chloride infusion  100 mL/hr IntraVENous CONTINUOUS  
 HYDROcodone-acetaminophen (NORCO) 5-325 mg per tablet 1 Tab  1 Tab Oral Q4H PRN  
 morphine injection 2 mg  2 mg IntraVENous Q4H PRN  
 naloxone (NARCAN) injection 0.4 mg  0.4 mg IntraVENous PRN  piperacillin-tazobactam (ZOSYN) 3.375 g in 0.9% sodium chloride (MBP/ADV) 100 mL  3.375 g IntraVENous Q8H  
 atorvastatin (LIPITOR) tablet 10 mg  10 mg Oral DAILY  carvediloL (COREG) tablet 3.125 mg  3.125 mg Oral BID WITH MEALS  finasteride (PROSCAR) tablet 5 mg  5 mg Oral DAILY  folic acid (FOLVITE) tablet 1 mg  1 mg Oral DAILY  gabapentin (NEURONTIN) capsule 100 mg  100 mg Oral TID  lamoTRIgine (LaMICtal) tablet 200 mg  200 mg Oral BID  lurasidone (LATUDA) tablet 80 mg (Patient Supplied)  80 mg Oral DAILY WITH BREAKFAST  levothyroxine (SYNTHROID) tablet 112 mcg  112 mcg Oral ACB  pantoprazole (PROTONIX) tablet 40 mg  40 mg Oral ACB&D  
 spironolactone (ALDACTONE) tablet 50 mg  50 mg Oral DAILY  tamsulosin (FLOMAX) capsule 0.4 mg  0.4 mg Oral DAILY  thiamine HCL (B-1) tablet 100 mg  100 mg Oral DAILY  traZODone (DESYREL) tablet 300 mg  300 mg Oral QHS  venlafaxine-SR (EFFEXOR-XR) capsule 75 mg  75 mg Oral DAILY  insulin lispro (HUMALOG) injection   SubCUTAneous AC&HS  rivaroxaban (XARELTO) tablet 20 mg  20 mg Oral DAILY WITH BREAKFAST  vancomycin (VANCOCIN) 1500 mg in  ml infusion  1,500 mg IntraVENous Q12H  clonazePAM (KlonoPIN) tablet 2 mg  2 mg Oral DAILY  clonazePAM (KlonoPIN) tablet 4 mg  4 mg Oral QHS Other Studies (last 24 hours): Xr Abd (kub) Result Date: 11/1/2020 KUB: CLINICAL HISTORY:  Bilateral quadrant abdominal wound after injury from fall. COMPARISON:  None. FINDINGS:  AP supine images demonstrates a moderate amount of stool scattered in the colon with gas in a few loops of nondilated small bowel in a nonspecific pattern. No abnormal soft tissue mass or calcific density is noted. Right hip prosthesis. IMPRESSION:  NONSPECIFIC BOWEL GAS PATTERN. Assessment and Plan:  
 
Hospital Problems as of 11/1/2020 Date Reviewed: 10/7/2020 Codes Class Noted - Resolved POA * (Principal) Cellulitis of abdominal wall ICD-10-CM: L03.311 ICD-9-CM: 682.2  10/31/2020 - Present Unknown [facility] ([facility] ICD-10-CM: C80.72 ICD-9-CM: 303.90  8/11/2020 - Present Yes Obesity, morbid ([facility] ICD-10-CM: E66.01 
ICD-9-CM: 278.01  6/24/2020 - Present Yes Controlled type 2 diabetes mellitus without complication, without long-term current use of insulin ([facility] ICD-10-CM: E11.9 ICD-9-CM: 250.00  6/24/2020 - Present Yes Bipolar disorder ([facility] ICD-10-CM: F31.9 ICD-9-CM: 296.80  8/19/2017 - Present Yes Overview Signed 6/24/2020  7:21 PM by Jazmyn Ponce MD  
  Last Assessment & Plan:  
Effexor Trazodone Essential hypertension ICD-10-CM: I10 
ICD-9-CM: 401.9  8/19/2017 - Present Yes Overview Signed 6/24/2020  7:21 PM by Jazmyn Ponce MD  
  Last Assessment & Plan:  
BPs in low normal range. -BPs continue to be normal range, not on home meds Gastroesophageal reflux disease with esophagitis ICD-10-CM: K21.00 ICD-9-CM: 530.11  8/19/2017 - Present Yes Overview Signed 6/24/2020  7:21 PM by Jazmyn Ponce MD  
  Last Assessment & Plan:  
Protonix BID Hypothyroidism ICD-10-CM: E03.9 ICD-9-CM: 244.9  8/19/2017 - Present Yes Overview Signed 6/24/2020  7:21 PM by Jazmyn Ponce MD  
  Last Assessment & Plan:  
Continue levothyroxine Check TSH Plan: Abdominal wall wound status post trauma D 2 vancomycin and Zosyn (no culture obtained) 10/31 General surgery w recs for non surgical management Today wound worse per nursing and pt- have requested surgery reassess Wet to dry dressings Requesting increased pain meds Left shoulder pain with radiculopathy Hx of cervical fusion done at [address] Has shoulder ROM but also has acute right 5th finger weakness and dullness Obtain shoulder x ray Consult PT Bipolar Unfortunately we do not have Latuda on formulary- substitute with Abilify for now Pt does not have anyone to bring him his home [place] Cont Lamictal and Klonopin Diabetes mellitus type 2 Humalog sliding scale insulin. Alcohol dependence Monitor for signs of alcohol withdrawal. 
  
Recent PE Uncertain etiology Continue home Xarelto. Obstructive sleep apnea CPAP. Discharge planning: Medical/surgical inpatient DVT ppx: Xarelto Signed: 
Shelby Walter NP

## 2020-11-01 NOTE — PROGRESS NOTES
Comprehensive Nutrition Assessment Type and Reason for Visit: Positive nutrition screen Nutrition Recommendations/Plan: 1) Continue CCHO diet. 2) Change ONS from Glucerna Shake (220 calories, 10 gm protein per carton) to Ensure High Protein (160 calories, 16 gm protein per bottle) to limit excess calorie load while providing adequate protein to promote wound healing. Nutrition Assessment:  60year old morbidly obese gentleman with a h/o diabetes, anxiety, bipolar disorder and alcohol abuse admitted with an abdominal wound which occurred after a fall onto a coffee table with an unknown time down. Estimated Daily Nutrient Needs: 
Energy (kcal): 3625-8910(81-72 nicole/kg/day - obese class III); Weight Used for Energy Requirements: Current Protein (g): (1.2-1.5 gm pro/kgIBW/day - open wound, obese); Weight Used for Protein Requirements: Ideal 
 
Nutrition Related Findings:  11/1: The patient reports that he feels that he lost weight because initially he reports \"just not being hungry\", but then he reports that since he's been sick he hasn't been able to go out to get food. He reports that he doesn't cook and relies on TV dinners, such as [employer]. He reports that he would sometimes go out to [facility] for a breakfast sandwich. Intake of his breakfast tray today was excellent (100%) and his supplement Glucerna Shake (100%). Wounds:   
Open wounds Current Nutrition Therapies: DIET DIABETIC CONSISTENT CARB Regular; 2 GM NA (House Low NA) DIET NUTRITIONAL SUPPLEMENTS All Meals; Glucerna Shake Anthropometric Measures: 
· Height:  6' (182.9 cm)(per EMR) · Current Body Wt:  142.6 kg (314 lb 6 oz)(11/1/2020 2nd floor [facility]) · Usual Body Wt:  150 kg (330 lb 11 oz)(\"3 months ago\" per patient) · Ideal Body Wt:  178 lbs:  176.6 % · BMI:  42.6 · BMI Category:  Obese class 3 (BMI 40.0 or greater) Nutrition Diagnosis: · Inadequate protein intake related to acute injury/trauma, increased demand for energy/nutrients as evidenced by wounds · Unintended weight loss related to acute injury/trauma as evidenced by poor intake prior to admission, weight loss Nutrition Interventions:  
Food and/or Nutrient Delivery: Continue current diet, Modify oral nutrition supplement Goals: 
Meet >75% of his daily nutrition needs and minimize hyperglycemia. Nutrition Monitoring and Evaluation:  
Food/Nutrient Intake Outcomes: Food and nutrient intake, Supplement intake Physical Signs/Symptoms Outcomes: Biochemical data, GI status Discharge Planning: Too soon to determine Electronically signed by Beverlie Chock Trude Soulier RD, LD, [address]  on 11/1/2020 at 12:41 PM 
Contact: [phone]

## 2020-11-01 NOTE — PROGRESS NOTES
Pt requesting to be d/c to a \"rehab place\" as he lives alone with no family in the area, has had frequent falls lately and is unable to perform wound care on himself. Order placed for CM per protocol.

## 2020-11-01 NOTE — PROGRESS NOTES
END OF SHIFT NOTE: 
 
INTAKE/OUTPUT 
10/31 0701 - 11/01 0700 In: 5547 [I.V.:1664] Out: 1200 [Urine:1200] Voiding: YES Catheter: NO 
Drain:   
 
 
 
 
 
Flatus: Patient does have flatus present. Stool:  0 occurrences. Characteristics: 
  
 
Emesis: 0 occurrences. Characteristics: VITAL SIGNS Patient Vitals for the past 12 hrs: 
 Temp Pulse Resp BP SpO2  
11/01/20 0224 97.7 °F (36.5 °C) 76 18 (!) 144/87 93 % 10/31/20 2200 98 °F (36.7 °C) 78 18 (!) 159/86 94 % 10/31/20 2158     96 % 10/31/20 2002 97.7 °F (36.5 °C) 78 18 (!) 156/96 93 % Pain Assessment Pain Intensity 1: 7 (11/01/20 0606) Pain Location 1: Neck, Shoulder Pain Intervention(s) 1: Medication (see MAR) Patient Stated Pain Goal: 0 Ambulating No 
 
Shift report given to oncoming nurse at the bedside.  
 
Angeles Bridges RN

## 2020-11-02 NOTE — PROGRESS NOTES
Problem: Mobility Impaired (Adult and Pediatric) Goal: *Acute Goals and Plan of Care (Insert Text) Description: LTG: 
(1.)Mr. Marvel Morel will move from supine to sit and sit to supine, scoot up and down and roll side to side in flat bed with INDEPENDENT within 7 day(s). (2.)Mr. Marvel Morel will transfer from bed to chair and chair to bed with INDEPENDENT using the least restrictive device within 7 day(s). (3.)Mr. Marvel Morel will ambulate with modified INDEPENDENCE for 250+ feet with the least restrictive/no device and sitting rest breaks as needed within 7 day(s). (4.)Mr. Marvel Morel will perform standing static and dynamic balance activities x 8 minutes with SUPERVISION to improve safety within 7 day(s). (5.)Mr. Marvel Morel will ascend and descend 4 stairs using one hand rail(s) with SUPERVISION to improve functional mobility and safety within 7 day(s). Outcome: Progressing Towards Goal 
  
PHYSICAL THERAPY: Initial Assessment and Daily Note 11/2/2020 INPATIENT: PT Visit Days : 1 Sling R UE Payor: Willie Franco / Plan: [place] BLUE CROSS [facility] / Product Type: PPO /   
  
NAME/AGE/GENDER: Chika English. is a 60 y.o. male PRIMARY DIAGNOSIS: Cellulitis of abdominal wall [L03.311] Cellulitis of abdominal wall Cellulitis of abdominal wall ICD-10: Treatment Diagnosis:  
 Other abnormalities of gait and mobility (R26.89) Repeated Falls (R29.6) Precaution/Allergies: Other plant, animal, environmental  
  
ASSESSMENT:  
 
Mr. Marvel Morel presents supine in bed and agreeable for PT. Placed sling on R UE for recent R greater tuberosity fracture per Dr. Lois Garcia. Patient reports that he lives alone and ambulates independently but falls frequently due to dizziness. Patient states he is not currently dizzy. Patient transitioned to sit with CGA with HOB elevated and cueing not to push with R UE. Patient stood with CGA, he was able to perform Romberg with trunk sway noted.   He was able to perform tandem stand, but only maintain a couple seconds before he reached out for the wall to steady himself. Patient then performed a Timed Up and Go in 18 seconds, which does indicate increased risk of falls in community dwelling adults. Patient ambulated the 21' with CGA. Initiated treatment to include further ambulation in hallway and standing exercises. Good effort, but patient does require frequent sitting rest breaks. Mr. Lucia Hunt would benefit from skilled physical therapy (medically necessary) to address his deficits and maximize his function. He may benefit from using a cane during ambulation. He will likely have difficulties with ADL's due to dominant UE in a sling, so requested OT evaluation for patient. Will defer UE ex (pendulums) to OT. This section established at most recent assessment PROBLEM LIST (Impairments causing functional limitations): 
Decreased Transfer Abilities Decreased Ambulation Ability/Technique Decreased Balance Increased Pain Decreased Activity Tolerance INTERVENTIONS PLANNED: (Benefits and precautions of physical therapy have been discussed with the patient.) Balance Exercise Bed Mobility Gait Training Home Exercise Program (HEP) Neuromuscular Re-education/Strengthening Therapeutic Activites Therapeutic Exercise/Strengthening Transfer Training 
education TREATMENT PLAN: Frequency/Duration: 3 times a week for duration of hospital stay Rehabilitation Potential For Stated Goals: Excellent REHAB RECOMMENDATIONS (at time of discharge pending progress):   
Placement: It is my opinion, based on this patient's performance to date, that Mr. Lucia Hunt may benefit from intensive therapy at [address] after discharge due to the functional deficits listed above that are likely to improve with skilled rehabilitation and concerns that he/she may be unsafe to be unsupervised at home due to frequent falls and now unable to use R UE . Equipment:  
Tbd-cane???  
    
 
 
 
HISTORY:  
 History of Present Injury/Illness (Reason for Referral): 
Per MD note, \"This is a 66-year-old male with past medical history significant for hypertension, non-insulin-dependent diabetes mellitus type 2, hypothyroidism, anxiety, bipolar disorder presented to the ER because of wound on his abdomen since 1 week. Patient states that he has been doing fairly well until few days ago when he was fixing his new sofa and fell onto a coffee table and hurt his abdomen. No visible bleeding. No fever. He reports some chills. No nausea no vomiting. He was seen in outside ER and was given Tetanus shot and prescribed doxycycline. Followed up with his primary care doctor and the wound has been progressively worsening over the last week. He denies any chest pain cough or shortness of breath. 10 systems reviewed and negative except as noted in HPI. Patient admitted for further evaluation management of abdominal wound. \" 
Past Medical History/Comorbidities:  
Mr. Dwayne George  has a past medical history of Anxiety, Chronic back pain, Colon polyps, Depression, Diabetes ([address] GERD (gastroesophageal reflux disease), Hemorrhoids, Hypertension, and Hypothyroidism. Mr. Dwayne George  has a past surgical history that includes hx back surgery (08/2019); hx cholecystectomy (06/2014); hx hip replacement (Right, 07/2011); hx other surgical (09/2016); and hx other surgical (07/2015). Social History/Living Environment:  
Home Environment: Private residence # Steps to Enter: 2 One/Two Story Residence: [facility] level # of Interior Steps: 12 Living Alone: Yes Support Systems: Other (comments)(family) Patient Expects to be Discharged to[de-identified] Other (comment) Current DME Used/Available at Home: CPAP Prior Level of Function/Work/Activity: 
Patient reports that he lives alone and ambulates independently but falls frequently due to dizziness. Number of Personal Factors/Comorbidities that affect the Plan of Care: 1-2: MODERATE COMPLEXITY EXAMINATION:  
Most Recent Physical Functioning:  
Gross Assessment: 
AROM: Within functional limits Strength: Within functional limits Sensation: Intact Posture: 
  
Balance: 
Sitting: Intact Standing: Impaired Standing - Static: Good Standing - Dynamic : Fair Bed Mobility: 
Supine to Sit: Stand-by assistance;Bed Modified Scooting: Contact guard assistance Wheelchair Mobility: 
  
Transfers: 
Sit to Stand: Contact guard assistance Stand to Sit: Contact guard assistance Bed to Chair: Contact guard assistance Gait: 
  
Speed/Juliana: Slow Step Length: Left shortened;Right shortened Distance (ft): 20 Feet (ft)(x2, then 100' more) Assistive Device: Gait belt Interventions: Verbal cues Body Structures Involved: 
Bones Joints Muscles Ligaments Body Functions Affected: 
Sensory/Pain Neuromusculoskeletal 
Movement Related Skin Related Activities and Participation Affected: Mobility Self Care Domestic Life Community, Social and [place] Number of elements that affect the Plan of Care: 4+: HIGH COMPLEXITY CLINICAL PRESENTATION:  
Presentation: Evolving clinical presentation with changing clinical characteristics: MODERATE COMPLEXITY CLINICAL DECISION MAKIN [place] [phone] AM-PAC 6 Clicks Basic Mobility Inpatient Short Form How much difficulty does the patient currently have. .. Unable A Lot A Little None 1. Turning over in bed (including adjusting bedclothes, sheets and blankets)? [] 1   [] 2   [x] 3   [] 4  
2. Sitting down on and standing up from a chair with arms ( e.g., wheelchair, bedside commode, etc.)   [] 1   [] 2   [x] 3   [] 4  
3. Moving from lying on back to sitting on the side of the bed? [] 1   [] 2   [x] 3   [] 4 How much help from another person does the patient currently need. .. Total A Lot A Little None 4. Moving to and from a bed to a chair (including a wheelchair)?    [] 1   [] 2   [x] 3   [] 4  
 5.  Need to walk in hospital room? [] 1   [] 2   [x] 3   [] 4  
6. Climbing 3-5 steps with a railing? [] 1   [] 2   [x] 3   [] 4  
© 2007, [employer] of [employer], under license to [employer]. All rights reserved Score:  Initial: 18 Most Recent: X (Date: -- ) Interpretation of Tool:  Represents activities that are increasingly more difficult (i.e. Bed mobility, Transfers, Gait). Medical Necessity:    
Patient is expected to demonstrate progress in  
balance and functional technique 
 to  
increase independence with   and improve safety during all functional mobility [place]ie [place] Reason for Services/Other Comments: 
Patient continues to require skilled intervention due to  
medical complications and patient unable to attend/participate in therapy as expected Osie Ra Use of outcome tool(s) and clinical judgement create a POC that gives a: Clear prediction of patient's progress: LOW COMPLEXITY  
  
 
 
 
TREATMENT:  
(In addition to Assessment/Re-Assessment sessions the following treatments were rendered) Pre-treatment Symptoms/Complaints:  none. Pain: Initial:  
Pain Intensity 1: 5 Pain Location 1: Shoulder Pain Orientation 1: Right Pain Intervention(s) 1: Repositioned  Post Session:  5 Therapeutic Exercise: ( 25 minutes):  Exercises per grid below to improve mobility, strength, and balance. Required moderate visual and verbal cues to promote proper body alignment. Progressed complexity of movement as indicated. Hand on tabletop for balance during standing ex's. Date: 11/2/20 Ambulation Device 
assistance 100' Gait belt CGA Partial Squats Hip Abduction/ Adduction Standing Heel Raises Standing X20 Toe Raises Standing x20 Hip Flexion Standing x10 Sit to Stand Key:  R=right, L=left, B=bilaterally, A=active, AA=active assisted, P=passive Braces/Orthotics/Lines/Etc:  
IV 
O2 Device: CPAP nasal 
Treatment/Session Assessment: Response to Treatment:  pleasant and cooperative. Interdisciplinary Collaboration:  
Physical Therapist 
Registered Nurse  After treatment position/precautions:  
Up in chair Bed/Chair-wheels locked Bed in low position Call light within reach RN notified Compliance with Program/Exercises: Compliant all of the time, Will assess as treatment progresses Recommendations/Intent for next treatment session: \"Next visit will focus on advancements to more challenging activities and reduction in assistance provided\". Total Treatment Duration: PT Patient Time In/Time Out Time In: 1100 Time Out: 1135 A Kvng Chacon, PT, DPT

## 2020-11-02 NOTE — WOUND CARE
Patient seen for abdominal wound due to trauma at home. It has blue-green drainage with light sweet odor (pseudomonas). Unhealthy red base and black eschar rim on wound. Wound 5x6x4 cm. Patient stated he can not do his dressings at home because he can not see it. He reports he does not have anyone to help him and home health won't do his dressings daily. Patient can reach his wound and can use a mirror to do his dressings at home if he is willing to do so, he stated he would be willing during assessment. He stated he can't drive right now and he needs to go to a rehab facility to help care for him right now. Will start Dakin's 1/4 strength gauze packing to wound to assist with bacterial load and assist in softening eschar present. Patient in agreement. If he goes home, would recommend he do a dressing on himself with nursing watching technique. Wound team will continue to monitor and assist while in acute care.

## 2020-11-02 NOTE — PROGRESS NOTES
Notified hospitalist of BPs  185/102  and  178/105, PTA home dose of olmesartan/hydrochlorothiazide to be started tonight.

## 2020-11-02 NOTE — PROGRESS NOTES
Pt placed on QHS CPAP (home) - resting comfortably 11/01/20 2027 Oxygen Therapy O2 Sat (%) 91 % Pulse via Oximetry 80 beats per minute O2 Device CPAP nasal  
Respiratory Respiratory (WDL) X Respiratory Pattern Regular Breath Sounds Bilateral Clear;Diminished CPAP/BIPAP  
CPAP/BIPAP Start/Stop On Device Mode CPAP, auto-titrating Mask Type and Size Nasal mask Skin Condition intact EPAP (cm H2O) 5 cm H2O 
(5-20 cm H2O - auto titrating ) Total RR (Spontaneous) 18 breaths per minute Leak (Estimated) (mask fits well) Pt's Home Machine Yes (type/vendor) (resmed) Biomedical Check Performed Yes Settings Verified Yes Alarm Settings Apnea 20 Pulmonary Toilet Pulmonary Toilet Cough and deep breath;H. O.B elevated

## 2020-11-02 NOTE — CONSULTS
Consult Patient: Lonnie Parnell MRN: [id number]  SSN: [id number] YOB: 1965  Age: 60 y.o. Sex: male Subjective:  
  
Lonnie Parnell is a 60 y.o. male who appears to have had a very low energy fall. He was complaining of some right shoulder pain. X-rays have revealed what appears to be a right greater tuberosity fracture that is almost nondisplaced. He also has a history of some cervical radiculopathy in his right upper extremity. He reports that he thinks that the pain in the right side of his neck into his shoulder may be coming from this. He is also complaining of some numbness in his small finger of his right hand. Past Medical History:  
Diagnosis Date  Anxiety  Chronic back pain  Colon polyps  Depression  Diabetes ([address]  GERD (gastroesophageal reflux disease)  Hemorrhoids  Hypertension  Hypothyroidism Past Surgical History:  
Procedure Laterality Date  HX BACK SURGERY  08/2019  HX CHOLECYSTECTOMY  06/2014  HX HIP REPLACEMENT Right 07/2011  HX OTHER SURGICAL  09/2016  
 cervical spine  HX OTHER SURGICAL  07/2015  
 ulnar nerve FAMHX -No history of inflammatory arthritis Social History Tobacco Use  Smoking status: Never Smoker  Smokeless tobacco: Never Used Substance Use Topics  Alcohol use: Yes Comment: liquor/wine regularly Current Facility-Administered Medications Medication Dose Route Frequency Provider Last Rate Last Dose  ondansetron (ZOFRAN) injection 4 mg  4 mg IntraVENous Q6H PRN Yaw Palomino MD   4 mg at 11/02/20 0246  Vancomycin Trough Reminder   Other ONCE Kota Reed MD      
 ARIPiprazole (ABILIFY) tablet 15 mg  15 mg Oral DAILY Laurell Coma, NP   15 mg at 11/02/20 0819  
 HYDROcodone-acetaminophen (NORCO) 7.5-325 mg per tablet 1 Tab  1 Tab Oral Q4H PRN Laurell Coma, NP   1 Tab at 11/02/20 8878  olmesartan/hydroCHLOROthiazide (BENICAR HCT) 40/12.5 mg   Oral DAILY Yajaira Reed MD      
 sodium chloride (NS) flush 5-40 mL  5-40 mL IntraVENous Q8H Yajaira Reed MD   10 mL at 11/01/20 1319  
 sodium chloride (NS) flush 5-40 mL  5-40 mL IntraVENous PRN Yajaira Reed MD      
 0.9% sodium chloride infusion  100 mL/hr IntraVENous CONTINUOUS Yajaira Reed  mL/hr at 11/02/20 0406 100 mL/hr at 11/02/20 0406  morphine injection 2 mg  2 mg IntraVENous Q4H PRN Yajaira Reed MD   2 mg at 11/01/20 2243  
 naloxone [facility]) injection 0.4 mg  0.4 mg IntraVENous PRN Yajaira Reed MD      
 piperacillin-tazobactam (ZOSYN) 3.375 g in 0.9% sodium chloride (MBP/ADV) 100 mL  3.375 g IntraVENous Q8H Diane Reed MD 25 mL/hr at 11/02/20 0111 3.375 g at 11/02/20 0111  
 atorvastatin (LIPITOR) tablet 10 mg  10 mg Oral DAILY Yajaira Reed MD   10 mg at 11/02/20 0820  carvediloL (COREG) tablet 3.125 mg  3.125 mg Oral BID WITH MEALS Yajaira Reed MD   3.125 mg at 11/02/20 0820  
 finasteride (PROSCAR) tablet 5 mg  5 mg Oral DAILY Yajaira Reed MD   5 mg at 11/02/20 6564  folic acid (FOLVITE) tablet 1 mg  1 mg Oral DAILY Diane Reed MD   1 mg at 11/02/20 0820  
 gabapentin (NEURONTIN) capsule 100 mg  100 mg Oral TID Yajaira Reed MD   100 mg at 11/02/20 0820  lamoTRIgine (LaMICtal) tablet 200 mg  200 mg Oral BID Yajaira Reed MD   200 mg at 11/02/20 6970  levothyroxine (SYNTHROID) tablet 112 mcg  112 mcg Oral ACB Yajaira Reed MD   112 mcg at 11/02/20 0545  pantoprazole (PROTONIX) tablet 40 mg  40 mg Oral ACB&D Yajaira Reed MD   40 mg at 11/02/20 0545  spironolactone (ALDACTONE) tablet 50 mg  50 mg Oral DAILY Yajaira Reed MD   50 mg at 11/02/20 0820  tamsulosin (FLOMAX) capsule 0.4 mg  0.4 mg Oral DAILY Yajaira Reed MD   0.4 mg at 11/02/20 0820  thiamine HCL (B-1) tablet 100 mg  100 mg Oral DAILY Yajaira Reed MD   100 mg at 11/02/20 0820  traZODone (DESYREL) tablet 300 mg  300 mg Oral QHS Kota Reed MD   300 mg at 11/01/20 2244  venlafaxine-SR (EFFEXOR-XR) capsule 75 mg  75 mg Oral DAILY Diane Reed MD   75 mg at 11/02/20 0820  
 insulin lispro (HUMALOG) injection   SubCUTAneous AC&HS Kota Reed MD   Stopped at 10/31/20 1630  
 rivaroxaban (XARELTO) tablet 20 mg  20 mg Oral DAILY WITH BREAKFAST Diane Reed MD   20 mg at 11/02/20 0820  
 vancomycin (VANCOCIN) 1500 mg in  ml infusion  1,500 mg IntraVENous Q12H Diane Reed  mL/hr at 11/01/20 2318 1,500 mg at 11/01/20 2318  clonazePAM (KlonoPIN) tablet 2 mg  2 mg Oral DAILY Kota Reed MD   2 mg at 11/02/20 4555  clonazePAM (KlonoPIN) tablet 4 mg  4 mg Oral QHS Kota Reed MD   4 mg at 11/01/20 2243 Allergies Allergen Reactions  Other Plant, Animal, Environmental Unknown (comments) Seasonal allergies (ragweed, etc.) Review of Systems: A comprehensive review of systems was negative except for that written in the History of Present Illness. Objective:  
 
Vitals:  
 11/01/20 2027 11/02/20 0040 11/02/20 0357 11/02/20 6062 BP:  (!) 147/94 (!) 148/90 (!) 163/94 Pulse:  79 79 79 Resp:  16 16 17 Temp:  98.4 °F (36.9 °C) 98.4 °F (36.9 °C) 97.6 °F (36.4 °C) SpO2: 91% 93% 94% 92% Weight:      
Height:      
  
 
Physical Exam: 
Physical Exam: 
General:  Alert, cooperative, no distress, appears stated age. Orientation oriented person place time and situation Eyes:  Conjunctivae/corneas clear. PERRL, EOMs intact. Fundi benign Ears:  Normal TMs and external ear canals both ears. Nose: Nares normal. Septum midline. Mucosa normal. No drainage or sinus tenderness. Mouth/Throat: Lips, mucosa, and tongue normal. Teeth and gums normal.  
Neck: Supple, symmetrical, trachea midline, no adenopathy, thyroid: no enlargment/tenderness/nodules, no carotid bruit and no JVD. Back:   Symmetric, no curvature. ROM normal. No CVA tenderness. Lungs:   Clear to auscultation bilaterally. Heart:  Regular rate and rhythm, S1, S2 normal, no murmur, click, rub or gallop. Abdomen:   Soft, non-tender. Bowel sounds normal. No masses,  No organomegaly. No lymphadenopathy in all 4 extremities Alignmenthe has near normal alignment of his right upper extremity Range of motionhe has only mild pain with passive or active range of motion of his right shoulder but he does have some positive impingement sign Vasculardistal pulses palpable in right upper extremity Sensory/motordeep tendon reflexes normal right upper extremity. Motor and sensory function intact the exception the does have some subjective numbness and the small finger of his right hand Stabilityevidence of any obvious instability right shoulder Tenderness to palpation over the lateral aspect of the right shoulder Skinopen wounds over the right upper extremity Gait-near normal 
 
Assessment:  
 
Hospital Problems  Date Reviewed: 10/7/2020 Codes Class Noted POA * (Principal) Cellulitis of abdominal wall ICD-10-CM: L03.311 ICD-9-CM: 682.2  10/31/2020 Unknown Alcoholism ([facility] ICD-10-CM: A98.14 ICD-9-CM: 303.90  8/11/2020 Yes Obesity, morbid ([facility] ICD-10-CM: E66.01 
ICD-9-CM: 278.01  6/24/2020 Yes Controlled type 2 diabetes mellitus without complication, without long-term current use of insulin ([facility] ICD-10-CM: E11.9 ICD-9-CM: 250.00  6/24/2020 Yes Bipolar disorder ([facility] ICD-10-CM: F31.9 ICD-9-CM: 296.80  8/19/2017 Yes Overview Signed 6/24/2020  7:21 PM by Ashley Marvin MD  
  Last Assessment & Plan:  
Effexor Trazodone Essential hypertension ICD-10-CM: I10 
ICD-9-CM: 401.9  8/19/2017 Yes Overview Signed 6/24/2020  7:21 PM by Ashley Marvin MD  
  Last Assessment & Plan:  
BPs in low normal range. -BPs continue to be normal range, not on home meds Gastroesophageal reflux disease with esophagitis ICD-10-CM: K21.00 ICD-9-CM: 530.11  8/19/2017 Yes Overview Signed 6/24/2020  7:21 PM by Tashia Rodriguez MD  
  Last Assessment & Plan:  
Protonix BID Hypothyroidism ICD-10-CM: E03.9 ICD-9-CM: 244.9  8/19/2017 Yes Overview Signed 6/24/2020  7:21 PM by Tashia Rodriguez MD  
  Last Assessment & Plan:  
Continue levothyroxine Check TSH Right closed minimally displaced greater tuberosity fracture Xrays and or studies: 
 
AP and lateral views of the right shoulder shows a greater tuberosity fracture of the right shoulder there is only minimally displaced and is well aligned on both views Plan: I do think that the x-rays show what appears to be a greater tuberosity fracture. This is something that would be treated nonoperatively. I think for now I would just give him a sling allow him to rest his upper extremity and hopefully alleviate his symptoms. I will need to see him approximately 3 weeks after discharge with AP and scapular Y views of the right shoulder. It is okay for him to come out of the right shoulder for bathing and to do simple pendulum exercises. Weightbearing right upper extremity Signed By: Nasim Aguirre MD   
 November 2, 2020

## 2020-11-02 NOTE — PROGRESS NOTES
END OF SHIFT NOTE: 
 
INTAKE/OUTPUT 
11/01 0701 - 11/02 0700 In: 9844 [I.V.:3230] Out: 7536 [FKVPI:8810] Voiding: YES Catheter: NO 
Drain:   
 
 
 
 
 
Flatus: Patient does have flatus present. Stool:  0 occurrences. Characteristics: 
Stool Assessment Stool Color: Yellow Stool Appearance: Formed Stool Amount: Small Stool Source/Status: Rectum Emesis: 0 occurrences. Characteristics: VITAL SIGNS Patient Vitals for the past 12 hrs: 
 Temp Pulse Resp BP SpO2  
11/02/20 0357 98.4 °F (36.9 °C) 79 16 (!) 148/90 94 % 11/02/20 0040 98.4 °F (36.9 °C) 79 16 (!) 147/94 93 % 11/01/20 2027     91 % 11/01/20 1920 98.8 °F (37.1 °C) 85 16 (!) 161/100 98 % Pain Assessment Pain Intensity 1: 7 (11/02/20 0222) Pain Location 1: Neck, Shoulder Pain Intervention(s) 1: Medication (see MAR) Patient Stated Pain Goal: 0 Ambulating Yes Shift report given to oncoming nurse at the bedside.  
 
Kely Gonzales RN

## 2020-11-02 NOTE — PROGRESS NOTES
END OF SHIFT NOTE: 
 
INTAKE/OUTPUT 
11/01 0701 - 11/02 0700 In: 8317 [I.V.:3230] Out: 2670 [Urine:2670] Voiding: YES Catheter: NO 
Drain:   
 
 
 
 
 
Flatus: Patient does have flatus present. Stool:  0  
ccurrences. Characteristics: 
Stool Assessment Stool Color: Yellow Stool Appearance: Formed Stool Amount: Small Stool Source/Status: Rectum Emesis: 0 occurrences. Characteristics: VITAL SIGNS Patient Vitals for the past 12 hrs: 
 Temp Pulse Resp BP SpO2  
11/02/20 1426 98.2 °F (36.8 °C) 83 18 (!) 144/91 96 % 11/02/20 0727 97.6 °F (36.4 °C) 79 17 (!) 163/94 92 % Pain Assessment Pain Intensity 1: 6 (11/02/20 1608) Pain Location 1: Shoulder Pain Intervention(s) 1: Medication (see MAR) Patient Stated Pain Goal: 0 Ambulating Yes Shift report given to oncoming nurse at the bedside. Polly Amin

## 2020-11-02 NOTE — PROGRESS NOTES
Pt requested referral sent to [facility], [facility] [address] [address] [facility], placed in [facility] link will await response.

## 2020-11-02 NOTE — PROGRESS NOTES
Met with pt at bedside, CM maintained social distance and PPE, pt states he lives alone in 2 level home with 3 steps to enter home but has unfinished basement with 13 steps, states he is independent with ADLs with several falls at home states has BP machine and CPAP at home he uses, drives without difficulty. Pt feels he needs to go to STR at this time for continued care for wounds and assistance with improving mobility. STR list provided to pt to make choices, CM will follow up with pt. PPD, PT/OT and covid test pending. Care Management Interventions PCP Verified by CM: Yes Transition of Care Consult (CM Consult): SNF Discharge Durable Medical Equipment: No 
Physical Therapy Consult: Yes Occupational Therapy Consult: Yes Speech Therapy Consult: No 
Current Support Network: Own Home, Lives Alone Confirm Follow Up Transport: [facility] Discharge Location Discharge Placement: Skilled nursing facility

## 2020-11-02 NOTE — PROGRESS NOTES
Hospitalist Progress Note Admit Date:  10/31/2020  8:31 AM  
Name:  Katelyn Garrido. Age:  60 y.o. 
:  1965 MRN:  [id number] PCP:  Hannah Nicolas MD 
Treatment Team: Attending Provider: Anthony Perea DO; Utilization Review: Thea Astorga; Physical Therapist: Yolanda Robles, PT, DPT; Consulting Provider: Kallie Gifford MD 
 
Subjective: HPI and or CC: 
18-year-old male with pmh HTN, DM, hypothyroidism, anxiety, bipolar disorder who presented to the ER because of wound on his abdomen x 1 week. Pt fell into a sofa 1 week ago and wound has been worsening since.  Mandy Wells was seen in outside ER and was given Tetanus shot and prescribed doxycycline with no improvement.  Started on Vanc/Zosyn. 10/31 Surgery evaluated wound and stated non surgical tx plan. : 
Seen by ortho and wound nurse. Sling for right shoulder. Alert and oriented x3. Afebrile. Objective:  
 
Patient Vitals for the past 24 hrs: 
 Temp Pulse Resp BP SpO2  
20 97.6 °F (36.4 °C) 79 17 (!) 163/94 92 % 20 0357 98.4 °F (36.9 °C) 79 16 (!) 148/90 94 % 20 0040 98.4 °F (36.9 °C) 79 16 (!) 147/94 93 % 20     91 % 20 1920 98.8 °F (37.1 °C) 85 16 (!) 161/100 98 % 20 1536 98.6 °F (37 °C) 76 18 (!) 150/89 94 % Oxygen Therapy O2 Sat (%): 92 % (20) Pulse via Oximetry: 80 beats per minute (20) O2 Device: CPAP nasal (20) FIO2 (%): 21 % (10/31/20 2158) Intake/Output Summary (Last 24 hours) at 2020 1218 Last data filed at 2020 0830 Gross per 24 hour Intake 3230 ml Output 2650 ml Net 580 ml REVIEW OF SYSTEMS: Comprehensive ROS performed and negative except as stated in HPI. Physical Examination: 
General:    Well nourished. No gross distress. Head:  Normocephalic, atraumatic, nares patent Eyes:  Extraocular movements intact, normal sclera CV: RRR. No  Murmurs, clicks, or gallops, distal pulses intact Lungs:   Unlabored, no cyanosis, no wheeze Abdomen:   Soft, nondistended, nontender. Extremities: Warm and dry. No cyanosis or edema. Skin:     No rashes or jaundice. Neuro:  No gross focal deficits, no tremor Psych:  Mood and affect appropriate Data Review: 
I have reviewed all labs, meds, telemetry events, and studies from the last 24 hours. Recent Results (from the past 24 hour(s)) GLUCOSE, POC Collection Time: 11/01/20  5:34 PM  
Result Value Ref Range Glucose (POC) 102 (H) 65 - 100 mg/dL GLUCOSE, POC Collection Time: 11/01/20  9:05 PM  
Result Value Ref Range Glucose (POC) 115 (H) 65 - 100 mg/dL GLUCOSE, POC Collection Time: 11/02/20  7:30 AM  
Result Value Ref Range Glucose (POC) 86 65 - 100 mg/dL GLUCOSE, POC Collection Time: 11/02/20 12:04 PM  
Result Value Ref Range Glucose (POC) 107 (H) 65 - 100 mg/dL All Micro Results Procedure Component Value Units Date/Time CULTURE, Carry Chill STAIN [[id number]] Order Status:  Sent Specimen:  Wound from Abdomen CULTURE, BLOOD [[id number]] Collected:  10/31/20 0904 Order Status:  Completed Specimen:  Blood Updated:  11/02/20 1986 Special Requests: RIGHT ANTECUBITAL Culture result: NO GROWTH 2 DAYS     
 CULTURE, BLOOD [[id number]] Collected:  10/31/20 0936 Order Status:  Completed Specimen:  Blood Updated:  11/02/20 1040 Special Requests: --     
  RIGHT 
HAND Culture result: NO GROWTH 2 DAYS Current Meds: 
Current Facility-Administered Medications Medication Dose Route Frequency  ondansetron (ZOFRAN) injection 4 mg  4 mg IntraVENous Q6H PRN  
 sodium hypochlorite (QUARTER STRENGTH DAKIN'S) 0.125% irrigation (bottle)   Topical BID  Vancomycin Trough Reminder   Other ONCE  
 ARIPiprazole (ABILIFY) tablet 15 mg  15 mg Oral DAILY  HYDROcodone-acetaminophen (NORCO) 7.5-325 mg per tablet 1 Tab  1 Tab Oral Q4H PRN  
 olmesartan/hydroCHLOROthiazide (BENICAR HCT) 40/12.5 mg   Oral DAILY  sodium chloride (NS) flush 5-40 mL  5-40 mL IntraVENous Q8H  
 sodium chloride (NS) flush 5-40 mL  5-40 mL IntraVENous PRN  
 0.9% sodium chloride infusion  100 mL/hr IntraVENous CONTINUOUS  
 morphine injection 2 mg  2 mg IntraVENous Q4H PRN  
 naloxone (NARCAN) injection 0.4 mg  0.4 mg IntraVENous PRN  piperacillin-tazobactam (ZOSYN) 3.375 g in 0.9% sodium chloride (MBP/ADV) 100 mL  3.375 g IntraVENous Q8H  
 atorvastatin (LIPITOR) tablet 10 mg  10 mg Oral DAILY  carvediloL (COREG) tablet 3.125 mg  3.125 mg Oral BID WITH MEALS  finasteride (PROSCAR) tablet 5 mg  5 mg Oral DAILY  folic acid (FOLVITE) tablet 1 mg  1 mg Oral DAILY  gabapentin (NEURONTIN) capsule 100 mg  100 mg Oral TID  lamoTRIgine (LaMICtal) tablet 200 mg  200 mg Oral BID  levothyroxine (SYNTHROID) tablet 112 mcg  112 mcg Oral ACB  pantoprazole (PROTONIX) tablet 40 mg  40 mg Oral ACB&D  
 spironolactone (ALDACTONE) tablet 50 mg  50 mg Oral DAILY  tamsulosin (FLOMAX) capsule 0.4 mg  0.4 mg Oral DAILY  thiamine HCL (B-1) tablet 100 mg  100 mg Oral DAILY  traZODone (DESYREL) tablet 300 mg  300 mg Oral QHS  venlafaxine-SR (EFFEXOR-XR) capsule 75 mg  75 mg Oral DAILY  insulin lispro (HUMALOG) injection   SubCUTAneous AC&HS  rivaroxaban (XARELTO) tablet 20 mg  20 mg Oral DAILY WITH BREAKFAST  clonazePAM (KlonoPIN) tablet 2 mg  2 mg Oral DAILY  clonazePAM (KlonoPIN) tablet 4 mg  4 mg Oral QHS Diet: DIET DIABETIC CONSISTENT CARB 
DIET NUTRITIONAL SUPPLEMENTS Other Studies (last 24 hours): 
Xr Spine Cerv Pa Lat [place] 3 V Max Result Date: 11/2/2020 THREE-VIEW CERVICAL SPINE: CLINICAL HISTORY:  Right shoulder pain and right ring finger paresthesias status post cervical fusion.  COMPARISON: None. FINDINGS:  AP, lateral, and open-mouth views demonstrate no definite acute fracture, malalignment, or elisa bony destruction. There is loss of normal lordosis. The patient is status post anterior instrumented fusion with plate and screw fixation at C5-7. There is degenerative disc disease with prominent anterior spurring at C3-4 and C4-5. Severe multilevel uncovertebral and facet spurring is also evident. The neural foramina are not adequately evaluated without oblique views. IMPRESSION:     ANTERIOR INSTRUMENTED FUSION AT C5-7 WITH LOSS OF NORMAL LORDOSIS AND SEVERE MULTILEVEL SPONDYLOSIS BUT NO DEFINITE ACUTE BONY ABNORMALITY IDENTIFIED. Xr Shoulder Rt Ap/lat Min 2 V Result Date: 11/1/2020 EXAMINATION: XR SHOULDER RT AP/LAT MIN 2 V 11/1/2020 1:05 PM COMPARISON: None available. INDICATION: pain after fall last week, right finger numb and weak TECHNIQUE: 3 views of the right shoulder were obtained FINDINGS: There may be a subtle multislice fracture of the greater tuberosity seen only on one view. Moderate glenohumeral joint arthrosis with osteophyte formation. Bony mineralization is preserved. The surrounding soft tissues are normal.  
 
IMPRESSION: 1. Possible subtle nondisplaced fracture of the greater tuberosity. 2. Moderate glenohumeral joint arthrosis. Assessment and Plan:  
 
Hospital Problems as of 11/2/2020 Date Reviewed: 10/7/2020 Codes Class Noted - Resolved POA * (Principal) Cellulitis of abdominal wall ICD-10-CM: L03.311 ICD-9-CM: 682.2  10/31/2020 - Present Unknown Alcoholism ([facility] ICD-10-CM: Z46.94 ICD-9-CM: 303.90  8/11/2020 - Present Yes Obesity, morbid ([facility] ICD-10-CM: E66.01 
ICD-9-CM: 278.01  6/24/2020 - Present Yes Controlled type 2 diabetes mellitus without complication, without long-term current use of insulin ([facility] ICD-10-CM: E11.9 ICD-9-CM: 250.00  6/24/2020 - Present Yes Bipolar disorder ([facility] ICD-10-CM: F31.9 ICD-9-CM: 296.80  8/19/2017 - Present Yes Overview Signed 6/24/2020  7:21 PM by Joelle Carrizales MD  
  Last Assessment & Plan:  
Effexor Trazodone Essential hypertension ICD-10-CM: I10 
ICD-9-CM: 401.9  8/19/2017 - Present Yes Overview Signed 6/24/2020  7:21 PM by Joelle Carrizales MD  
  Last Assessment & Plan:  
BPs in low normal range. -BPs continue to be normal range, not on home meds Gastroesophageal reflux disease with esophagitis ICD-10-CM: K21.00 ICD-9-CM: 530.11  8/19/2017 - Present Yes Overview Signed 6/24/2020  7:21 PM by Joelle Carrizales MD  
  Last Assessment & Plan:  
Protonix BID Hypothyroidism ICD-10-CM: E03.9 ICD-9-CM: 244.9  8/19/2017 - Present Yes Overview Signed 6/24/2020  7:21 PM by Joelle Carrizales MD  
  Last Assessment & Plan:  
Continue levothyroxine Check TSH 
  
  
   
  
 
 
A/P:   
Abdominal wall wound status post trauma D 3 vancomycin and Zosyn (no culture obtained)-will de-escalate today and stop Vancomycin. 10/31 General surgery w recs for non surgical management Wound care consult and wound culture Wound care per wound nursing 
 
  
Left shoulder pain with radiculopathy Hx of cervical fusion done at [address] Has shoulder ROM but also has acute right 5th finger weakness and dullness Obtain shoulder x ray Consult PT  
  
Bipolar Unfortunately we do not have Latuda on formulary- substitute with Abilify for now Pt does not have anyone to bring him his home [place] Cont Lamictal and Klonopin 
  
Diabetes mellitus type 2 Humalog sliding scale insulin. 
  
Alcohol dependence Monitor for signs of alcohol withdrawal. 
  
Recent PE Uncertain etiology Continue home Xarelto. 
  
Obstructive sleep apnea CPAP. Right shoulder greater tuberosity fx: 
Seen by ortho today 
nonoperative at present Sling ordered Will need 3 week follow up with ortho Signed: 
Evette Bingham NP

## 2020-11-02 NOTE — PROGRESS NOTES
END OF SHIFT NOTE: 
 
INTAKE/OUTPUT 
10/31 0701 - 11/01 0700 In: 6739 [I.V.:1664] Out: 1200 [Urine:1200] Voiding: YES Catheter: NO 
Drain:   
 
 
 
 
 
Flatus: Patient does have flatus present. Stool:  1 occurrences. Characteristics: 
Stool Assessment Stool Color: Yellow Stool Appearance: Formed Stool Amount: Small Stool Source/Status: Rectum Emesis: 0 occurrences. Characteristics: VITAL SIGNS Patient Vitals for the past 12 hrs: 
 Temp Pulse Resp BP SpO2  
11/01/20 1536 98.6 °F (37 °C) 76 18 (!) 150/89 94 % 11/01/20 1146 98.8 °F (37.1 °C) 80 16 (!) 155/89 95 % Pain Assessment Pain Intensity 1: 7 (11/01/20 1717) Pain Location 1: Neck, Shoulder Pain Intervention(s) 1: Medication (see MAR) Patient Stated Pain Goal: 0 Ambulating Yes Shift report given to oncoming nurse at the bedside.  
 
Emelyn Chavez

## 2020-11-03 NOTE — PROGRESS NOTES
ORTH FRACTURE PROGRESS NOTE November 3, 2020 Admit Date:  
10/31/2020 Post Op day: * No surgery found * Subjective:   
Cherry Banister. PATIENT DOING BETTER - ISSUES WITH BP  
 
PT/OT:  
Gait:  Gait Speed/Juliana: Slow Step Length: Left shortened, Right shortened Distance (ft): 20 Feet (ft)(x2, then 100' more) Assistive Device: Gait belt Interventions: Verbal cues Interventions: Verbal cues Vital Signs:   
Patient Vitals for the past 8 hrs: 
 BP Temp Pulse Resp SpO2  
20 0720 (!) 168/94 97.9 °F (36.6 °C) 80 18 97 % 20 0432 (!) 166/88 98.1 °F (36.7 °C) 76 18 98 % 20 0111 137/76  74   Temp (24hrs), Av.2 °F (36.8 °C), Min:97.9 °F (36.6 °C), Max:98.9 °F (37.2 °C) Pain Control:  
Pain Assessment Pain Scale 1: Numeric (0 - 10) Pain Intensity 1: 4 Pain Onset 1: ongoing Pain Location 1: Shoulder, Neck Pain Orientation 1: Right Pain Description 1: Aching Pain Intervention(s) 1: Medication (see MAR) Meds: 
 
Current Facility-Administered Medications Medication Dose Route Frequency  ondansetron (ZOFRAN) injection 4 mg  4 mg IntraVENous Q6H PRN  
 sodium hypochlorite (QUARTER STRENGTH DAKIN'S) 0.125% irrigation (bottle)   Topical BID  tuberculin injection 5 Units  5 Units IntraDERMal ONCE  
 ARIPiprazole (ABILIFY) tablet 15 mg  15 mg Oral DAILY  HYDROcodone-acetaminophen (NORCO) 7.5-325 mg per tablet 1 Tab  1 Tab Oral Q4H PRN  
 olmesartan/hydroCHLOROthiazide (BENICAR HCT) 40/12.5 mg   Oral DAILY  sodium chloride (NS) flush 5-40 mL  5-40 mL IntraVENous Q8H  
 sodium chloride (NS) flush 5-40 mL  5-40 mL IntraVENous PRN  
 0.9% sodium chloride infusion  100 mL/hr IntraVENous CONTINUOUS  
 morphine injection 2 mg  2 mg IntraVENous Q4H PRN  
 naloxone (NARCAN) injection 0.4 mg  0.4 mg IntraVENous PRN  piperacillin-tazobactam (ZOSYN) 3.375 g in 0.9% sodium chloride (MBP/ADV) 100 mL  3.375 g IntraVENous Q8H  
  atorvastatin (LIPITOR) tablet 10 mg  10 mg Oral DAILY  carvediloL (COREG) tablet 3.125 mg  3.125 mg Oral BID WITH MEALS  finasteride (PROSCAR) tablet 5 mg  5 mg Oral DAILY  folic acid (FOLVITE) tablet 1 mg  1 mg Oral DAILY  gabapentin (NEURONTIN) capsule 100 mg  100 mg Oral TID  lamoTRIgine (LaMICtal) tablet 200 mg  200 mg Oral BID  levothyroxine (SYNTHROID) tablet 112 mcg  112 mcg Oral ACB  pantoprazole (PROTONIX) tablet 40 mg  40 mg Oral ACB&D  
 spironolactone (ALDACTONE) tablet 50 mg  50 mg Oral DAILY  tamsulosin (FLOMAX) capsule 0.4 mg  0.4 mg Oral DAILY  thiamine HCL (B-1) tablet 100 mg  100 mg Oral DAILY  traZODone (DESYREL) tablet 300 mg  300 mg Oral QHS  venlafaxine-SR (EFFEXOR-XR) capsule 75 mg  75 mg Oral DAILY  insulin lispro (HUMALOG) injection   SubCUTAneous AC&HS  rivaroxaban (XARELTO) tablet 20 mg  20 mg Oral DAILY WITH BREAKFAST  clonazePAM (KlonoPIN) tablet 2 mg  2 mg Oral DAILY  clonazePAM (KlonoPIN) tablet 4 mg  4 mg Oral QHS  
 
 
LAB:   
Recent Labs 11/01/20 
0440 HCT 31.1* HGB 10.3*  
 
 
24 Hour Assessment Issues:   
Oriented Discharge Planning: SNF Transfuse PRBC's:   
 
Assessment & Physician's Comment: 
 
Neurovascular checks within normal limits Principal Problem: 
  Cellulitis of abdominal wall (10/31/2020) Active Problems: 
  Obesity, morbid ([facility] (6/24/2020) Bipolar disorder ([facility] (8/19/2017) Overview: Last Assessment & Plan:  
    Effexor Trazodone Essential hypertension (8/19/2017) Overview: Last Assessment & Plan:  
    BPs in low normal range. -BPs continue to be normal range, not on home meds Gastroesophageal reflux disease with esophagitis (8/19/2017) Overview: Last Assessment & Plan:  
    Protonix BID Hypothyroidism (8/19/2017) Overview: Last Assessment & Plan:  
    Continue levothyroxine     Check TSH 
 
 Controlled type 2 diabetes mellitus without complication, without long-term current use of insulin ([facility] (6/24/2020) Alcoholism ([facility] (8/11/2020) Plan:  CONTINUE THERAPY FOLLOW-UP WITH DR [facility] 
 SNF FOR [facility] [place] NP

## 2020-11-03 NOTE — PROGRESS NOTES
Hospitalist Progress Note Admit Date:  10/31/2020  8:31 AM  
Name:  Susan Mae. Age:  60 y.o. 
:  1965 MRN:  [id number] PCP:  Casi Snowden MD 
Treatment Team: Attending Provider: Geo Arce MD; Utilization Review: Kayy Fletcher; Consulting Provider: Jose Payton MD; Care Manager: Arvind Andrea RN; Utilization Review: Amy Goldman RN; Occupational Therapist: Stephanie Brown; Physical Therapist: Bull Hardin, PT, DPT Subjective: HPI and or CC: 
49-year-old male with pmh HTN, DM, hypothyroidism, anxiety, bipolar disorder who presented to the ER because of wound on his abdomen x 1 week. Pt fell into a sofa 1 week ago and wound has been worsening since.  Edvin Carver was seen in outside ER and was given Tetanus shot and prescribed doxycycline with no improvement.  Started on Vanc/Zosyn. 10/31 Surgery evaluated wound and stated non surgical tx plan. : seen by ortho for right shoulder fx. Will follow up outpatient 3 weeks. 11/3: 
 Sling for right shoulder. Alert and oriented x3. Afebrile. Remains with slight tremor. States baseline with \"bipolar\". Objective:  
 
Patient Vitals for the past 24 hrs: 
 Temp Pulse Resp BP SpO2  
20 97.9 °F (36.6 °C) 80 18 (!) 168/94 97 % 20 0432 98.1 °F (36.7 °C) 76 18 (!) 166/88 98 % 20 0111  74  137/76   
20 2337 98.1 °F (36.7 °C) 78 18 (!) 174/100 95 % 20 2135    (!) 161/90   
20 1927 98.9 °F (37.2 °C) 80 18 (!) 175/109 100 % 20 1426 98.2 °F (36.8 °C) 83 18 (!) 144/91 96 % Oxygen Therapy O2 Sat (%): 97 % (20) Pulse via Oximetry: 80 beats per minute (20) O2 Device: CPAP nasal (20 1277) FIO2 (%): 21 % (10/31/20 9628) Intake/Output Summary (Last 24 hours) at 11/3/2020 1834 Last data filed at 11/3/2020 0430 Gross per 24 hour Intake 1845 ml Output 3175 ml Net -1330 ml  
   
 
 REVIEW OF SYSTEMS: Comprehensive ROS performed and negative except as stated in HPI. Physical Examination: 
General:    Well nourished. No gross distress. Head:  Normocephalic, atraumatic, nares patent Eyes:  Extraocular movements intact, normal sclera CV:   RRR. No  Murmurs, clicks, or gallops, distal pulses intact Lungs:   Unlabored, no cyanosis, no wheeze Abdomen:   Soft, nondistended, nontender. Abdominal wound dressing dry/intact. Extremities: Warm and dry. No cyanosis or edema. Skin:     No rashes or jaundice. Neuro:  No gross focal deficits, slight tremor Psych:  Mood and affect appropriate Data Review: 
I have reviewed all labs, meds, telemetry events, and studies from the last 24 hours. Recent Results (from the past 24 hour(s)) GLUCOSE, POC Collection Time: 11/02/20 12:04 PM  
Result Value Ref Range Glucose (POC) 107 (H) 65 - 100 mg/dL [facility] Collection Time: 11/02/20 12:15 PM  
Result Value Ref Range Vancomycin,trough 13.0 5 - 20 ug/mL CULTURE, WOUND W GRAM STAIN Collection Time: 11/02/20  1:40 PM  
 Specimen: Abdomen; Wound Result Value Ref Range Special Requests: NO SPECIAL REQUESTS    
 GRAM STAIN PENDING Culture result: MODERATE GRAM NEGATIVE RODS SUBCULTURE IN PROGRESS (A) SARS-COV-2 Collection Time: 11/02/20  3:19 PM  
Result Value Ref Range Specimen source NP SWAB COVID-19 rapid test Not detected NOTD    
 SARS CoV-2 PENDING   
GLUCOSE, POC Collection Time: 11/02/20  4:38 PM  
Result Value Ref Range Glucose (POC) 120 (H) 65 - 100 mg/dL GLUCOSE, POC Collection Time: 11/02/20  9:14 PM  
Result Value Ref Range Glucose (POC) 111 (H) 65 - 100 mg/dL GLUCOSE, POC Collection Time: 11/03/20  7:13 AM  
Result Value Ref Range Glucose (POC) 95 65 - 100 mg/dL METABOLIC PANEL, BASIC Collection Time: 11/03/20  7:35 AM  
Result Value Ref Range Sodium 143 136 - 145 mmol/L  Potassium 3.7 3.5 - 5.1 mmol/L  
 Chloride 110 (H) 98 - 107 mmol/L  
 CO2 27 21 - 32 mmol/L Anion gap 6 (L) 7 - 16 mmol/L Glucose 93 65 - 100 mg/dL BUN 8 6 - 23 MG/DL Creatinine 0.95 0.8 - 1.5 MG/DL  
 GFR est AA >60 >60 ml/min/1.73m2 GFR est non-AA >60 >60 ml/min/1.73m2 Calcium 8.0 (L) 8.3 - 10.4 MG/DL  
CBC WITH AUTOMATED DIFF Collection Time: 11/03/20  8:45 AM  
Result Value Ref Range WBC 5.1 4.3 - 11.1 K/uL  
 RBC 3.55 (L) 4.23 - 5.6 M/uL  
 HGB 11.7 (L) 13.6 - 17.2 g/dL HCT 35.0 (L) 41.1 - 50.3 % MCV 98.6 (H) 79.6 - 97.8 FL  
 MCH 33.0 (H) 26.1 - 32.9 PG  
 MCHC 33.4 31.4 - 35.0 g/dL  
 RDW 13.8 11.9 - 14.6 % PLATELET 627 389 - 356 K/uL MPV 8.7 (L) 9.4 - 12.3 FL ABSOLUTE NRBC 0.00 0.0 - 0.2 K/uL  
 DF AUTOMATED NEUTROPHILS 55 43 - 78 % LYMPHOCYTES 30 13 - 44 % MONOCYTES 10 4.0 - 12.0 % EOSINOPHILS 3 0.5 - 7.8 % BASOPHILS 1 0.0 - 2.0 % IMMATURE GRANULOCYTES 1 0.0 - 5.0 %  
 ABS. NEUTROPHILS 2.8 1.7 - 8.2 K/UL  
 ABS. LYMPHOCYTES 1.6 0.5 - 4.6 K/UL  
 ABS. MONOCYTES 0.5 0.1 - 1.3 K/UL  
 ABS. EOSINOPHILS 0.2 0.0 - 0.8 K/UL  
 ABS. BASOPHILS 0.1 0.0 - 0.2 K/UL  
 ABS. IMM. GRANS. 0.1 0.0 - 0.5 K/UL All Micro Results Procedure Component Value Units Date/Time CULTURE, Denise Laws STAIN [[id number]]  (Abnormal) Collected:  11/02/20 1340 Order Status:  Completed Specimen:  Wound from Abdomen Updated:  11/03/20 2636 Special Requests: NO SPECIAL REQUESTS     
  GRAM STAIN PENDING Culture result: MODERATE GRAM NEGATIVE RODS SUBCULTURE IN PROGRESS  
     
 CULTURE, BLOOD [[id number]] Collected:  10/31/20 4707 Order Status:  Completed Specimen:  Blood Updated:  11/02/20 0799 Special Requests: RIGHT ANTECUBITAL Culture result: NO GROWTH 2 DAYS     
 CULTURE, BLOOD [[id number]] Collected:  10/31/20 0936 Order Status:  Completed Specimen:  Blood Updated:  11/02/20 3242 Special Requests: --     
  RIGHT 
HAND Culture result: NO GROWTH 2 DAYS Current Meds: 
Current Facility-Administered Medications Medication Dose Route Frequency  ondansetron (ZOFRAN) injection 4 mg  4 mg IntraVENous Q6H PRN  
 sodium hypochlorite (QUARTER STRENGTH DAKIN'S) 0.125% irrigation (bottle)   Topical BID  tuberculin injection 5 Units  5 Units IntraDERMal ONCE  
 ARIPiprazole (ABILIFY) tablet 15 mg  15 mg Oral DAILY  HYDROcodone-acetaminophen (NORCO) 7.5-325 mg per tablet 1 Tab  1 Tab Oral Q4H PRN  
 olmesartan/hydroCHLOROthiazide (BENICAR HCT) 40/12.5 mg   Oral DAILY  sodium chloride (NS) flush 5-40 mL  5-40 mL IntraVENous Q8H  
 sodium chloride (NS) flush 5-40 mL  5-40 mL IntraVENous PRN  
 0.9% sodium chloride infusion  100 mL/hr IntraVENous CONTINUOUS  
 morphine injection 2 mg  2 mg IntraVENous Q4H PRN  
 naloxone (NARCAN) injection 0.4 mg  0.4 mg IntraVENous PRN  piperacillin-tazobactam (ZOSYN) 3.375 g in 0.9% sodium chloride (MBP/ADV) 100 mL  3.375 g IntraVENous Q8H  
 atorvastatin (LIPITOR) tablet 10 mg  10 mg Oral DAILY  carvediloL (COREG) tablet 3.125 mg  3.125 mg Oral BID WITH MEALS  finasteride (PROSCAR) tablet 5 mg  5 mg Oral DAILY  folic acid (FOLVITE) tablet 1 mg  1 mg Oral DAILY  gabapentin (NEURONTIN) capsule 100 mg  100 mg Oral TID  lamoTRIgine (LaMICtal) tablet 200 mg  200 mg Oral BID  levothyroxine (SYNTHROID) tablet 112 mcg  112 mcg Oral ACB  pantoprazole (PROTONIX) tablet 40 mg  40 mg Oral ACB&D  
 spironolactone (ALDACTONE) tablet 50 mg  50 mg Oral DAILY  tamsulosin (FLOMAX) capsule 0.4 mg  0.4 mg Oral DAILY  thiamine HCL (B-1) tablet 100 mg  100 mg Oral DAILY  traZODone (DESYREL) tablet 300 mg  300 mg Oral QHS  venlafaxine-SR (EFFEXOR-XR) capsule 75 mg  75 mg Oral DAILY  insulin lispro (HUMALOG) injection   SubCUTAneous AC&HS  rivaroxaban (XARELTO) tablet 20 mg  20 mg Oral DAILY WITH BREAKFAST  clonazePAM (KlonoPIN) tablet 2 mg  2 mg Oral DAILY  clonazePAM (KlonoPIN) tablet 4 mg  4 mg Oral QHS Diet: DIET DIABETIC CONSISTENT CARB 
DIET NUTRITIONAL SUPPLEMENTS Other Studies (last 24 hours): No results found. Assessment and Plan:  
 
Hospital Problems as of 11/3/2020 Date Reviewed: 10/7/2020 Codes Class Noted - Resolved POA * (Principal) Cellulitis of abdominal wall ICD-10-CM: L03.311 ICD-9-CM: 682.2  10/31/2020 - Present Unknown Alcoholism ([facility] ICD-10-CM: M28.61 ICD-9-CM: 303.90  8/11/2020 - Present Yes Obesity, morbid ([facility] ICD-10-CM: E66.01 
ICD-9-CM: 278.01  6/24/2020 - Present Yes Controlled type 2 diabetes mellitus without complication, without long-term current use of insulin ([facility] ICD-10-CM: E11.9 ICD-9-CM: 250.00  6/24/2020 - Present Yes Bipolar disorder ([facility] ICD-10-CM: F31.9 ICD-9-CM: 296.80  8/19/2017 - Present Yes Overview Signed 6/24/2020  7:21 PM by Nishi Akers MD  
  Last Assessment & Plan:  
Effexor Trazodone Essential hypertension ICD-10-CM: I10 
ICD-9-CM: 401.9  8/19/2017 - Present Yes Overview Signed 6/24/2020  7:21 PM by Nishi Akers MD  
  Last Assessment & Plan:  
BPs in low normal range. -BPs continue to be normal range, not on home meds Gastroesophageal reflux disease with esophagitis ICD-10-CM: K21.00 ICD-9-CM: 530.11  8/19/2017 - Present Yes Overview Signed 6/24/2020  7:21 PM by Nishi Akres MD  
  Last Assessment & Plan:  
Protonix BID Hypothyroidism ICD-10-CM: E03.9 ICD-9-CM: 244.9  8/19/2017 - Present Yes Overview Signed 6/24/2020  7:21 PM by Nishi Akers MD  
  Last Assessment & Plan:  
Continue levothyroxine Check TSH 
  
  
   
  
 
 
A/P:   
Abdominal wall wound status post trauma D 3 vancomycin and Zosyn (no culture obtained)-will de-escalate today and stop Vancomycin. 10/31 General surgery w recs for non surgical management Wound care consult and wound culture Wound care per wound nursing 
 
  
Left shoulder pain with radiculopathy Hx of cervical fusion done at [facility] [address] Has shoulder ROM but also has acute right 5th finger weakness and dullness Obtain shoulder x ray Consult PT  
  
Bipolar Unfortunately we do not have Latuda on formulary- substitute with Abilify for now Pt does not have anyone to bring him his home [place] Cont Lamictal and Klonopin 
  
Diabetes mellitus type 2 Humalog sliding scale insulin. 
  
Alcohol dependence Monitor for signs of alcohol withdrawal. 
States last drank over 1 week prior to admission. On Vitamins 
  
Recent PE Uncertain etiology Continue home Xarelto. 
  
Obstructive sleep apnea CPAP. Right shoulder greater tuberosity fx: 
Seen by ortho 11/2 
nonoperative at present Sling ordered Will need 3 week follow up with ortho Cervical spondylosis: 
Per ortho Signed: 
Serge Delgado NP
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
## 2020-11-03 NOTE — PROGRESS NOTES
Bed offer from [facility] and bed accepted, [facility] started today, called spoke with emergency contact Lizzy Delonte (pts cousin) who is going to try and bring CPAP machine and extra cloths from pts home for STR. Pt states if he does not have CPAP then he can use oxygen via [place] at night instead. CM will remain available.

## 2020-11-03 NOTE — PROGRESS NOTES
11/03/20 1504 Vital Signs Level of Consciousness Alert  
BP (!) 158/100 
(rn notified) MAP (Calculated) 119 Provider notified of pt BP. Orders received. Will continue to monitor.

## 2020-11-03 NOTE — PROGRESS NOTES
Problem: Self Care Deficits Care Plan (Adult) Goal: *Acute Goals and Plan of Care (Insert Text) Description: 1. Patient will complete lower body bathing and dressing with Min A and adaptive equipment as needed. 2. Patient will complete toileting with Min A and adaptive equipment as needed. 3. Patient will tolerate 23 minutes of OT treatment with 1-2 rest breaks to increase activity tolerance for ADLs. 4. Patient will complete functional transfers with Supervision and good static and dynamic standing balance. 5. Patient will don/doff RUE sling with independence and no verbal cues. 6. Patient will complete standing ADL with Set Up and good static and dynamic standing balance. Timeframe: 7 visits Outcome: Progressing Towards Goal 
  
OCCUPATIONAL THERAPY: Initial Assessment, Daily Note, and AM 11/3/2020 INPATIENT: OT Visit Days: 1 Payor: Deepthi Canales / Plan: [place] BLUE CROSS [facility]– [place] / Product Type: PPO /  
  
NAME/AGE/GENDER: Jannie De Souza is a 60 y.o. male PRIMARY DIAGNOSIS:  Cellulitis of abdominal wall [L03.311] Cellulitis of abdominal wall Cellulitis of abdominal wall ICD-10: Treatment Diagnosis:  
 · Generalized Muscle Weakness (M62.81) · Other lack of cordination (R27.8) · Difficulty in walking, Not elsewhere classified (R26.2) Precautions/Allergies: RUE sling donned at all times except when completing bathing and light pendulum exercises. Other plant, animal, environmental  
  
ASSESSMENT:  
 
Mr. More Su is a 60 y.o. male admitted for Cellulitis of abdominal wall and s/p recent fall that resulted in RUE greater tuberosity fracture and pt is to wear RUE sling at all times except when performing bathing and light pendulum exercises. At baseline, patient lives alone in split level home with 3 ALONSO , followed by 2 ALONSO upper level and 13 ALONSO basement with washer and dryer. Pt is typically independent for ADLs, IADLs, and + driving.  Pt uses no DME for functional mobility. Pt has no family in the area who is available to assist him. Pt found seated in recliner chair just having finished PT session. Alert and agreeable to initial OT evaluation. Pt reports no pain prior to or following functional mobility. Seated in recliner chair, pt with intact static and dynamic seated balance. RUE grossly decreased for ROM, strength, and gross motor coordination secondary to fracture. Fine motor coordination generally decreased yet functional. Pt reports decreased sensation in L hand secondary to ulnar nerve damage in 4th and 5th digits. Pt also reports some concern that he may have injured his R 5th digit during the fall and reports that he is awaiting results from imaging. Pt is R hand dominant. Pt requires Total A for sock management. Pt requires CGA (without use of DME) to complete sit to stand. Upon standing, pt with fair (+) static and fair dynamic standing balance with slight posterior lean. Pt requires CGA to walk from recliner chair to sink and CGA for balance to complete grooming ADL secondary to slight posterior lean. Pt requires Min A to brush teeth with assistance needed for placement of toothbrush and toothpaste. Pt requires CGA to walk from sink to commode and complete transfer onto and off of commode. Pt requires CGA to walk from commode to bed and complete stand to sit. Pt educated on how to doff and don sling to increase pt independence for management of sling. Pt requires Min A and moderate verbal and visual cueing to complete on 1st attempt and SBA and minimal verbal and visual cueing to complete on 2nd attempt. Following seated rest break, pt requires CGA for sit to stand and to walk from bed to room door and return to recliner chair and complete stand to sit. Pt left seated in recliner chair with all needs met and within reach. RN notified.   
 
Pt is currently functioning below baseline for ADLs and functional mobility and would benefit from acute OT to increase independence and safety with ADLs and functional mobility. Will follow for duration of hospital stay to address the above goals. Patient was educated on role and plan of care of occupational therapy. This section established at most recent assessment PROBLEM LIST (Impairments causing functional limitations): 1. Decreased Strength 2. Decreased ADL/Functional Activities 3. Decreased Transfer Abilities 4. Decreased Ambulation Ability/Technique 5. Decreased Balance 6. Decreased Activity Tolerance 7. Increased Fatigue 8. Decreased Flexibility/Joint Mobility 9. Decreased Skin Integrity/Hygeine INTERVENTIONS PLANNED: (Benefits and precautions of occupational therapy have been discussed with the patient.) 1. Activities of daily living training 2. Adaptive equipment training 3. Balance training 4. Clothing management 5. Re-evaluation 6. Therapeutic activity 7. Therapeutic exercise TREATMENT PLAN: Frequency/Duration: Follow patient 3x/week to address above goals. Rehabilitation Potential For Stated Goals: Good REHAB RECOMMENDATIONS (at time of discharge pending progress):   
Placement: It is my opinion, based on this patient's performance to date, that Mr. Brent Meza may benefit from intensive therapy at a [address] [place] [address] after discharge due to the functional deficits listed above that are likely to improve with skilled rehabilitation and concerns that he/she may be unsafe to be unsupervised at home due to decreased independence, safety, balance, strength, ROM, and activity tolerance for performance of ADLs and functional mobility. Equipment: ? TBD  
    
 
 
 
OCCUPATIONAL PROFILE AND HISTORY:  
History of Present Injury/Illness (Reason for Referral): 
See H & P Past Medical History/Comorbidities:  
Mr. Brent Meza  has a past medical history of Anxiety, Chronic back pain, Colon polyps, Depression, Diabetes ([facility] [address] GERD (gastroesophageal reflux disease), Hemorrhoids, Hypertension, and Hypothyroidism. Mr. Lucia Hunt  has a past surgical history that includes hx back surgery (08/2019); hx cholecystectomy (06/2014); hx hip replacement (Right, 07/2011); hx other surgical (09/2016); and hx other surgical (07/2015). Social History/Living Environment:  
Home Environment: Private residence # Steps to Enter: 2 One/Two Story Residence: Split level # of Interior Steps: 15 Living Alone: Yes Support Systems: Family member(s) Patient Expects to be Discharged to[de-identified] Rehabilitation facility Current DME Used/Available at Home: CPAP Tub or Shower Type: Shower Prior Level of Function/Work/Activity: At baseline, patient lives alone in split level home with 3 ALONSO , followed by 2 ALONSO upper level and 13 AOLNSO basement with washer and dryer. Pt is typically independent for ADLs, IADLs, and + driving. Pt uses no DME for functional mobility. Pt has no family in the area who is available to assist him. Personal Factors:   
      Sex:  male Age:  60 y.o. Number of Personal Factors/Comorbidities that affect the Plan of Care: Extensive review of physical, cognitive, and psychosocial performance (3+):  HIGH COMPLEXITY ASSESSMENT OF OCCUPATIONAL PERFORMANCE[de-identified]  
Activities of Daily Living:  
Basic ADLs (From Assessment) Complex ADLs (From Assessment) Feeding: Minimum assistance Oral Facial Hygiene/Grooming: Minimum assistance Bathing: Moderate assistance Upper Body Dressing: Minimum assistance Lower Body Dressing: Maximum assistance Toileting: Maximum assistance Instrumental ADL Meal Preparation: Total assistance Homemaking: Total assistance Grooming/Bathing/Dressing Activities of Daily Living Grooming Grooming Assistance: Minimum assistance Position Performed: Standing Brushing Teeth: Minimum assistance Upper Body Dressing Assistance Dressing Assistance: Minimum assistance Orthotics(Brace): Minimum assistance;Stand-by assistance(RUE sling) Lower Body Dressing Assistance Socks: Total assistance (dependent) Bed/Mat Mobility Supine to Sit: Stand-by assistance Sit to Stand: Contact guard assistance Stand to Sit: Contact guard assistance Bed to Chair: Contact guard assistance Most Recent Physical Functioning:  
Gross Assessment: 
AROM: Grossly decreased, non-functional 
Strength: Grossly decreased, non-functional 
Coordination: Grossly decreased, non-functional 
Sensation: Impaired(pt reports ulnar nerve damage ) Posture: 
  
Balance: 
Sitting: Intact Standing: Impaired Standing - Static: Fair(+) Standing - Dynamic : Fair Bed Mobility: 
Supine to Sit: Stand-by assistance Wheelchair Mobility: 
  
Transfers: 
Sit to Stand: Contact guard assistance Stand to Sit: Contact guard assistance Bed to Chair: Contact guard assistance Patient Vitals for the past 6 hrs: 
 BP SpO2 Pulse 11/03/20 0720 (!) 168/94 97 % 80  
11/03/20 1130 (!) 168/87 95 % 94 Mental Status Neurologic State: Alert Orientation Level: Oriented X4 Cognition: Follows commands Physical Skills Involved: 1. Range of Motion 2. Balance 3. Strength 4. Activity Tolerance 5. Sensation 6. Fine Motor Control 7. Gross Motor Control 8. Pain (acute) Cognitive Skills Affected (resulting in the inability to perform in a timely and safe manner): 1. None noted Psychosocial Skills Affected: 1. Habits/Routines 2. Environmental Adaptation 3. Social Interaction Number of elements that affect the Plan of Care: 5+:  HIGH COMPLEXITY CLINICAL DECISION MAKING:  
[employer] AM-PAC 6 Clicks Daily Activity Inpatient Short Form How much help from another person does the patient currently need. .. Total A Lot A Little None 1. Putting on and taking off regular lower body clothing?    [] 1   [x] 2   [] 3   [] 4  
 2.  Bathing (including washing, rinsing, drying)? [] 1   [x] 2   [] 3   [] 4  
3. Toileting, which includes using toilet, bedpan or urinal?   [] 1   [x] 2   [] 3   [] 4  
4. Putting on and taking off regular upper body clothing? [] 1   [] 2   [x] 3   [] 4  
5. Taking care of personal grooming such as brushing teeth? [] 1   [] 2   [x] 3   [] 4  
6. Eating meals? [] 1   [] 2   [x] 3   [] 4  
© 2007, Trustees [address] [address], under license to [employer]. All rights reserved Score:  Initial: 15, completed, 11/3/2020 Most Recent: X (Date: -- ) Interpretation of Tool:  Represents activities that are increasingly more difficult (i.e. Bed mobility, Transfers, Gait). Medical Necessity:    
· Skilled intervention continues to be required due to decreased independence, safety, balance, strength, ROM, and activity tolerance for performance of ADLs and functional mobility. Tonye Cogan Reason for Services/Other Comments: 
· Patient continues to require skilled intervention due to medical complications and patient unable to attend/participate in therapy as expected. Use of outcome tool(s) and clinical judgement create a POC that gives a: MODERATE COMPLEXITY  
 
 
 
TREATMENT:  
(In addition to Assessment/Re-Assessment sessions the following treatments were rendered) Pre-treatment Symptoms/Complaints:  No complaints Pain: Initial:  
Pain Intensity 1: 0  Post Session:  Unchanged Self Care: (8 minutes): Procedure(s) utilized to improve and/or restore self-care/home management as related to dressing, toileting and grooming. Required minimal visual and verbal cueing and minimal manual assist to facilitate activities of daily living skills and compensatory activities. Pt requires CGA to walk from recliner chair to sink and CGA for balance to complete grooming ADL secondary to slight posterior lean.  Pt requires Min A to brush teeth with assistance needed for placement of toothbrush and toothpaste. Pt requires CGA to walk from sink to commode and complete transfer onto and off of commode. Pt requires CGA to walk from commode to bed and complete stand to sit. Pt educated on how to doff and don sling to increase pt independence for management of sling. Pt requires Min A and moderate verbal and visual cueing to complete on 1st attempt and SBA and minimal verbal and visual cueing to complete on 2nd attempt. Braces/Orthotics/Lines/Etc:  
· IV Treatment/Session Assessment:   
· Response to Treatment:  No adverse reaction · Interdisciplinary Collaboration:  
o Physical Therapist 
o Occupational Therapist 
o Registered Nurse · After treatment position/precautions:  
o Up in chair 
o Bed/Chair-wheels locked 
o Bed in low position 
o Call light within reach 
o RN notified · Compliance with Program/Exercises: Compliant all of the time. · Recommendations/Intent for next treatment session: \"Next visit will focus on advancements to more challenging activities and reduction in assistance provided\". Total Treatment Duration: OT Patient Time In/Time Out Time In: 9408 Time Out: 1155 WILL Jacob/SUMAN

## 2020-11-03 NOTE — PROGRESS NOTES
Problem: Pressure Injury - Risk of 
Goal: *Prevention of pressure injury Description: Document Cale Scale and appropriate interventions in the flowsheet. Outcome: Progressing Towards Goal 
Note: Pressure Injury Interventions: 
Sensory Interventions: Assess changes in LOC, Check visual cues for pain Moisture Interventions: Absorbent underpads, Contain wound drainage Activity Interventions: Increase time out of bed, Pressure redistribution bed/mattress(bed type) Mobility Interventions: Pressure redistribution bed/mattress (bed type) Nutrition Interventions: Document food/fluid/supplement intake Problem: Patient Education: Go to Patient Education Activity Goal: Patient/Family Education Outcome: Progressing Towards Goal 
  
Problem: Falls - Risk of 
Goal: *Absence of Falls Description: Document Marcia Hannah Fall Risk and appropriate interventions in the flowsheet. Outcome: Progressing Towards Goal 
Note: Fall Risk Interventions: 
Mobility Interventions: Communicate number of staff needed for ambulation/transfer, Patient to call before getting OOB Medication Interventions: Patient to call before getting OOB, Teach patient to arise slowly Elimination Interventions: Call light in reach, Patient to call for help with toileting needs, Toileting schedule/hourly rounds, Urinal in reach History of Falls Interventions: Investigate reason for fall Problem: Patient Education: Go to Patient Education Activity Goal: Patient/Family Education Outcome: Progressing Towards Goal 
  
Problem: Patient Education: Go to Patient Education Activity Goal: Patient/Family Education Outcome: Progressing Towards Goal 
  
Problem: Patient Education: Go to Patient Education Activity Goal: Patient/Family Education Outcome: Progressing Towards Goal

## 2020-11-03 NOTE — PROGRESS NOTES
Problem: Mobility Impaired (Adult and Pediatric) Goal: *Acute Goals and Plan of Care (Insert Text) Description: LTG: 
(1.)Mr. Vanessa Marroquin will move from supine to sit and sit to supine, scoot up and down and roll side to side in flat bed with INDEPENDENT within 7 day(s). (2.)Mr. Vanessa Marroquin will transfer from bed to chair and chair to bed with INDEPENDENT using the least restrictive device within 7 day(s). (3.)Mr. Vanessa Marroquin will ambulate with modified INDEPENDENCE for 250+ feet with the least restrictive/no device and sitting rest breaks as needed within 7 day(s). (4.)Mr. Vanessa Marroquin will perform standing static and dynamic balance activities x 8 minutes with SUPERVISION to improve safety within 7 day(s). (5.)Mr. Vanessa Marroquin will ascend and descend 4 stairs using one hand rail(s) with SUPERVISION to improve functional mobility and safety within 7 day(s). Outcome: Progressing Towards Goal 
  
PHYSICAL THERAPY: Daily Note 11/3/2020 INPATIENT: PT Visit Days : 2 Sling R UE Payor: Moncho De Luna / Plan: [place] BLUE CROSS [facility] [place] / Product Type: PPO /   
  
NAME/AGE/GENDER: Jordan Gomez. is a 60 y.o. male PRIMARY DIAGNOSIS: Cellulitis of abdominal wall [L03.311] Cellulitis of abdominal wall Cellulitis of abdominal wall ICD-10: Treatment Diagnosis:  
 · Other abnormalities of gait and mobility (R26.89) · Repeated Falls (R29.6) Precaution/Allergies: Other plant, animal, environmental  
  
ASSESSMENT:  
 
Mr. Vanessa Marroquin presents supine in bed and agreeable for PT. Sling on R UE for recent R greater tuberosity fracture adjusted for proper fit. Patient transitioned to sit with SBA with HOB elevated and cueing not to push with R UE. Worked on posture in sitting, instructed patient in neck retraction with chin tuck and he required max verbal and tactile cueing to perform. Patient stood with CGA then ambulated the 75'x2 with CGA. Worked on standing exercises in room with hand on tabletop for balance.   Good effort, but patient does require frequent sitting rest breaks. Patient demonstrated progress with activity tolerance today. Mr. Marium Mancini would benefit from skilled physical therapy (medically necessary) to address his deficits and maximize his function. He may benefit from using a cane during ambulation. This section established at most recent assessment PROBLEM LIST (Impairments causing functional limitations): 1. Decreased Transfer Abilities 2. Decreased Ambulation Ability/Technique 3. Decreased Balance 4. Increased Pain 5. Decreased Activity Tolerance INTERVENTIONS PLANNED: (Benefits and precautions of physical therapy have been discussed with the patient.) 1. Balance Exercise 2. Bed Mobility 3. Gait Training 4. Home Exercise Program (HEP) 5. Neuromuscular Re-education/Strengthening 6. Therapeutic Activites 7. Therapeutic Exercise/Strengthening 8. Transfer Training 9. education TREATMENT PLAN: Frequency/Duration: 3 times a week for duration of hospital stay Rehabilitation Potential For Stated Goals: Excellent REHAB RECOMMENDATIONS (at time of discharge pending progress):   
Placement: It is my opinion, based on this patient's performance to date, that Mr. Marium Mancini may benefit from intensive therapy at a [address] [place] [address] after discharge due to the functional deficits listed above that are likely to improve with skilled rehabilitation and concerns that he/she may be unsafe to be unsupervised at home due to frequent falls and now unable to use R UE . Equipment: ? Tbd-cane???  
    
 
 
 
HISTORY:  
History of Present Injury/Illness (Reason for Referral): 
Per MD note, \"This is a 57-year-old male with past medical history significant for hypertension, non-insulin-dependent diabetes mellitus type 2, hypothyroidism, anxiety, bipolar disorder presented to the ER because of wound on his abdomen since 1 week.   Patient states that he has been doing fairly well until few days ago when he was fixing his new sofa and fell onto a coffee table and hurt his abdomen. No visible bleeding. No fever. He reports some chills. No nausea no vomiting. He was seen in outside ER and was given Tetanus shot and prescribed doxycycline. Followed up with his primary care doctor and the wound has been progressively worsening over the last week. He denies any chest pain cough or shortness of breath. 10 systems reviewed and negative except as noted in HPI. Patient admitted for further evaluation management of abdominal wound. \" 
Past Medical History/Comorbidities:  
Mr. Robyn Enriquez  has a past medical history of Anxiety, Chronic back pain, Colon polyps, Depression, Diabetes ([address] GERD (gastroesophageal reflux disease), Hemorrhoids, Hypertension, and Hypothyroidism. Mr. Robyn Enriquez  has a past surgical history that includes hx back surgery (08/2019); hx cholecystectomy (06/2014); hx hip replacement (Right, 07/2011); hx other surgical (09/2016); and hx other surgical (07/2015). Social History/Living Environment:  
Home Environment: Private residence # Steps to Enter: 2 One/Two Story Residence: [facility] level # of Interior Steps: 12 Living Alone: Yes Support Systems: Other (comments)(family) Patient Expects to be Discharged to[de-identified] Other (comment) Current DME Used/Available at Home: CPAP Prior Level of Function/Work/Activity: 
Patient reports that he lives alone and ambulates independently but falls frequently due to dizziness. Number of Personal Factors/Comorbidities that affect the Plan of Care: 1-2: MODERATE COMPLEXITY EXAMINATION:  
Most Recent Physical Functioning:  
Gross Assessment: 
AROM: Within functional limits Strength: Within functional limits Sensation: Intact Posture: 
  
Balance: 
  Bed Mobility: 
Supine to Sit: Stand-by assistance Wheelchair Mobility: 
  
Transfers: 
Sit to Stand: Contact guard assistance Stand to Sit: Contact guard assistance Gait: 
  
Speed/Juliana: Slow Step Length: Left shortened;Right shortened Distance (ft): 75 Feet (ft)(x2) 
Assistive Device: Gait belt Interventions: Verbal cues Body Structures Involved: 1. Bones 2. Joints 3. Muscles 4. Ligaments Body Functions Affected: 1. Sensory/Pain 2. Neuromusculoskeletal 
3. Movement Related 4. Skin Related Activities and Participation Affected: 1. Mobility 2. Self Care 3. Domestic Life 4. Community, Social and [place] Number of elements that affect the Plan of Care: 4+: HIGH COMPLEXITY CLINICAL PRESENTATION:  
Presentation: Evolving clinical presentation with changing clinical characteristics: MODERATE COMPLEXITY CLINICAL DECISION MAKING:  
[facility] AM-PAC 6 Clicks Basic Mobility Inpatient Short Form How much difficulty does the patient currently have. .. Unable A Lot A Little None 1. Turning over in bed (including adjusting bedclothes, sheets and blankets)? [] 1   [] 2   [x] 3   [] 4  
2. Sitting down on and standing up from a chair with arms ( e.g., wheelchair, bedside commode, etc.)   [] 1   [] 2   [x] 3   [] 4  
3. Moving from lying on back to sitting on the side of the bed? [] 1   [] 2   [x] 3   [] 4 How much help from another person does the patient currently need. .. Total A Lot A Little None 4. Moving to and from a bed to a chair (including a wheelchair)? [] 1   [] 2   [x] 3   [] 4  
5. Need to walk in hospital room? [] 1   [] 2   [x] 3   [] 4  
6. Climbing 3-5 steps with a railing? [] 1   [] 2   [x] 3   [] 4  
© 2007, Trustees of [facility], under license to [employer]. All rights reserved Score:  Initial: 18 Most Recent: X (Date: -- ) Interpretation of Tool:  Represents activities that are increasingly more difficult (i.e. Bed mobility, Transfers, Gait).  
 
Medical Necessity:    
· Patient is expected to demonstrate progress in  
 · balance and functional technique ·  to  
· increase independence with   and improve safety during all functional mobility · . Reason for Services/Other Comments: 
· Patient continues to require skilled intervention due to · medical complications and patient unable to attend/participate in therapy as expected · . Use of outcome tool(s) and clinical judgement create a POC that gives a: Clear prediction of patient's progress: LOW COMPLEXITY  
  
 
 
 
TREATMENT:  
(In addition to Assessment/Re-Assessment sessions the following treatments were rendered) Pre-treatment Symptoms/Complaints:  none. Pain: Initial:  
Pain Intensity 1: 2 Pain Location 1: Shoulder Pain Orientation 1: Right Pain Intervention(s) 1: Repositioned  Post Session:  2 Therapeutic Exercise: (  18 minutes):  Exercises per grid below to improve mobility, strength, and balance. Required moderate visual and verbal cues to promote proper body alignment. Progressed complexity of movement as indicated. Hand on tabletop for balance during standing ex's. Date: 11/2/20 11/3/20 Ambulation Device 
assistance 100' Gait belt CGA 75'x2 Gait belt CGA Partial Squats Hip Abduction/ Adduction Standing Heel Raises Standing X20  x20 Toe Raises Standing x20 x20 Hip Flexion Standing x10 Sit to Stand  x10 Key:  R=right, L=left, B=bilaterally, A=active, AA=active assisted, P=passive Braces/Orthotics/Lines/Etc:  
· IV Treatment/Session Assessment:   
· Response to Treatment:  pleasant and cooperative. · Interdisciplinary Collaboration:  
o Physical Therapist 
o Occupational Therapist 
o Registered Nurse · After treatment position/precautions:  
o Up in chair 
o Bed/Chair-wheels locked 
o Bed in low position 
o Call light within reach 
o RN notified · Compliance with Program/Exercises: Compliant all of the time, Will assess as treatment progresses · Recommendations/Intent for next treatment session: \"Next visit will focus on advancements to more challenging activities and reduction in assistance provided\". Total Treatment Duration: PT Patient Time In/Time Out Time In: 0811 Time Out: 1123 A Venkatesh Mccurdy, PT, DPT

## 2020-11-03 NOTE — PROGRESS NOTES
Pt /107 and 175/109. denies pain, all of PTA HTN meds are already ordered. Hospitalist notified, labetalol 10mg IV x1 ordered. 2135: BP re-check 161/90. 
 
2343: BP back up to 174/100, hospitalist notified, hydralazine 10mg IV x1 ordered. 0100: BP re-check 137/76

## 2020-11-04 NOTE — PROGRESS NOTES
Hospitalist Progress Note Admit Date:  10/31/2020  8:31 AM  
Name:  Chika Rivas. Age:  60 y.o. 
:  1965 MRN:  [id number] PCP:  Jasbir Shah MD 
Treatment Team: Attending Provider: Richard Gary MD; Utilization Review: Vanessa Fletcher Covert; Consulting Provider: Wendy Amaya MD; Care Manager: Austen Branch RN; Utilization Review: Gerald Rodriges RN; Physical Therapist: Johnathan Munoz, PT, DPT Subjective: HPI and or CC: 
49-year-old male with pmh HTN, DM, hypothyroidism, anxiety, bipolar disorder who presented to the ER because of wound on his abdomen x 1 week. Pt fell into a sofa 1 week ago and wound has been worsening since.  Melecio Solorzano was seen in outside ER and was given Tetanus shot and prescribed doxycycline with no improvement.  Started on Vanc/Zosyn. 10/31 Surgery evaluated wound and stated non surgical tx plan. : seen by ortho for right shoulder fx. Will follow up outpatient 3 weeks. : 
 Sling for right shoulder. Alert and oriented x3. Afebrile. Wound culture gram negative rods, remains on Zosyn. Discharge planning: sruthi Castellon started  by . Objective:  
 
Patient Vitals for the past 24 hrs: 
 Temp Pulse Resp BP SpO2  
20 0834  79  (!) 172/91   
20 0720 97.8 °F (36.6 °C) 79 19 (!) 172/91 95 % 20 0403 98.1 °F (36.7 °C) 77 19 (!) 166/95 95 % 20 2337 97.9 °F (36.6 °C) 82 19 (!) 157/83 94 % 20 2037    (!) 173/113   
20 1936 98.8 °F (37.1 °C) 81 19 (!) 173/113 95 % 20 1634    (!) 142/85   
20 1504 98.1 °F (36.7 °C) 79 20 (!) 158/100 96 % 20 1130 98 °F (36.7 °C) 94 19 (!) 168/87 95 % Oxygen Therapy O2 Sat (%): 95 % (20) Pulse via Oximetry: 80 beats per minute (20) O2 Device: Room air (20) FIO2 (%): 21 % (10/31/20 2158) Intake/Output Summary (Last 24 hours) at 2020 1057 Last data filed at 2020 9794 Gross per 24 hour Intake 2486 ml Output 5500 ml Net -3014 ml REVIEW OF SYSTEMS: Comprehensive ROS performed and negative except as stated in HPI. Physical Examination: 
General:    Well nourished. No gross distress. Head:  Normocephalic, atraumatic, nares patent Eyes:  Extraocular movements intact, normal sclera CV:   RRR. No  Murmurs, clicks, or gallops, distal pulses intact Lungs:   Unlabored, no cyanosis, no wheeze Abdomen:   Soft, nondistended, nontender. Abdominal wound dressing dry/intact. Extremities: Warm and dry. No cyanosis or edema. Skin:     No rashes or jaundice. Neuro:  No gross focal deficits, slight tremor Psych:  Mood and affect appropriate Data Review: 
I have reviewed all labs, meds, telemetry events, and studies from the last 24 hours. Recent Results (from the past 24 hour(s)) GLUCOSE, POC Collection Time: 11/03/20 11:59 AM  
Result Value Ref Range Glucose (POC) 130 (H) 65 - 100 mg/dL PLEASE READ & DOCUMENT PPD TEST IN 24 HRS Collection Time: 11/03/20  2:49 PM  
Result Value Ref Range PPD Negative Negative  
 mm Induration 0 0 - 5 mm GLUCOSE, POC Collection Time: 11/03/20  4:27 PM  
Result Value Ref Range Glucose (POC) 122 (H) 65 - 100 mg/dL GLUCOSE, POC Collection Time: 11/03/20  9:14 PM  
Result Value Ref Range Glucose (POC) 111 (H) 65 - 100 mg/dL CBC WITH AUTOMATED DIFF Collection Time: 11/04/20  5:36 AM  
Result Value Ref Range WBC 5.9 4.3 - 11.1 K/uL  
 RBC 3.24 (L) 4.23 - 5.6 M/uL  
 HGB 10.6 (L) 13.6 - 17.2 g/dL HCT 31.9 (L) 41.1 - 50.3 % MCV 98.5 (H) 79.6 - 97.8 FL  
 MCH 32.7 26.1 - 32.9 PG  
 MCHC 33.2 31.4 - 35.0 g/dL  
 RDW 14.0 11.9 - 14.6 % PLATELET 135 774 - 313 K/uL MPV 9.0 (L) 9.4 - 12.3 FL ABSOLUTE NRBC 0.00 0.0 - 0.2 K/uL  
 DF AUTOMATED NEUTROPHILS 55 43 - 78 % LYMPHOCYTES 30 13 - 44 % MONOCYTES 10 4.0 - 12.0 % EOSINOPHILS 3 0.5 - 7.8 %  BASOPHILS 1 0.0 - 2.0 %  
 IMMATURE GRANULOCYTES 1 0.0 - 5.0 %  
 ABS. NEUTROPHILS 3.2 1.7 - 8.2 K/UL  
 ABS. LYMPHOCYTES 1.8 0.5 - 4.6 K/UL  
 ABS. MONOCYTES 0.6 0.1 - 1.3 K/UL  
 ABS. EOSINOPHILS 0.2 0.0 - 0.8 K/UL  
 ABS. BASOPHILS 0.1 0.0 - 0.2 K/UL  
 ABS. IMM. GRANS. 0.1 0.0 - 0.5 K/UL METABOLIC PANEL, BASIC Collection Time: 11/04/20  5:36 AM  
Result Value Ref Range Sodium 141 136 - 145 mmol/L Potassium 3.4 (L) 3.5 - 5.1 mmol/L Chloride 109 (H) 98 - 107 mmol/L  
 CO2 27 21 - 32 mmol/L Anion gap 5 (L) 7 - 16 mmol/L Glucose 93 65 - 100 mg/dL BUN 9 6 - 23 MG/DL Creatinine 0.86 0.8 - 1.5 MG/DL  
 GFR est AA >60 >60 ml/min/1.73m2 GFR est non-AA >60 >60 ml/min/1.73m2 Calcium 8.2 (L) 8.3 - 10.4 MG/DL  
GLUCOSE, POC Collection Time: 11/04/20  7:24 AM  
Result Value Ref Range Glucose (POC) 97 65 - 100 mg/dL All Micro Results Procedure Component Value Units Date/Time CULTURE, Richard Yepezo STAIN [[id number]]  (Abnormal) Collected:  11/02/20 1340 Order Status:  Completed Specimen:  Wound from Abdomen Updated:  11/04/20 0920 Special Requests: NO SPECIAL REQUESTS     
  GRAM STAIN 25 TO 50 WBCS SEEN PER OIF  
   FEW GRAM NEGATIVE RODS Culture result: MODERATE GRAM NEGATIVE RODS IDENTIFICATION AND SUSCEPTIBILITY TO FOLLOW CULTURE IN PROGRESS,FURTHER UPDATES TO FOLLOW CULTURE, BLOOD [[id number]] Collected:  10/31/20 0904 Order Status:  Completed Specimen:  Blood Updated:  11/04/20 0654 Special Requests: RIGHT ANTECUBITAL Culture result: NO GROWTH 4 DAYS     
 CULTURE, BLOOD [[id number]] Collected:  10/31/20 0936 Order Status:  Completed Specimen:  Blood Updated:  11/04/20 0654 Special Requests: --     
  RIGHT 
HAND Culture result: NO GROWTH 4 DAYS Current Meds: 
Current Facility-Administered Medications Medication Dose Route Frequency  potassium chloride (K-DUR, KLOR-CON) SR tablet 40 mEq  40 mEq Oral BID  
  polyethylene glycol (MIRALAX) packet 17 g  17 g Oral DAILY  carvediloL (COREG) tablet 6.25 mg  6.25 mg Oral BID WITH MEALS  
 hydrALAZINE (APRESOLINE) 20 mg/mL injection 10 mg  10 mg IntraVENous Q6H PRN  
 ondansetron (ZOFRAN) injection 4 mg  4 mg IntraVENous Q6H PRN  
 sodium hypochlorite (QUARTER STRENGTH DAKIN'S) 0.125% irrigation (bottle)   Topical BID  ARIPiprazole (ABILIFY) tablet 15 mg  15 mg Oral DAILY  HYDROcodone-acetaminophen (NORCO) 7.5-325 mg per tablet 1 Tab  1 Tab Oral Q4H PRN  
 olmesartan/hydroCHLOROthiazide (BENICAR HCT) 40/12.5 mg   Oral DAILY  sodium chloride (NS) flush 5-40 mL  5-40 mL IntraVENous Q8H  
 sodium chloride (NS) flush 5-40 mL  5-40 mL IntraVENous PRN  
 0.9% sodium chloride infusion  100 mL/hr IntraVENous CONTINUOUS  
 morphine injection 2 mg  2 mg IntraVENous Q4H PRN  
 naloxone (NARCAN) injection 0.4 mg  0.4 mg IntraVENous PRN  piperacillin-tazobactam (ZOSYN) 3.375 g in 0.9% sodium chloride (MBP/ADV) 100 mL  3.375 g IntraVENous Q8H  
 atorvastatin (LIPITOR) tablet 10 mg  10 mg Oral DAILY  finasteride (PROSCAR) tablet 5 mg  5 mg Oral DAILY  folic acid (FOLVITE) tablet 1 mg  1 mg Oral DAILY  gabapentin (NEURONTIN) capsule 100 mg  100 mg Oral TID  lamoTRIgine (LaMICtal) tablet 200 mg  200 mg Oral BID  levothyroxine (SYNTHROID) tablet 112 mcg  112 mcg Oral ACB  pantoprazole (PROTONIX) tablet 40 mg  40 mg Oral ACB&D  
 spironolactone (ALDACTONE) tablet 50 mg  50 mg Oral DAILY  tamsulosin (FLOMAX) capsule 0.4 mg  0.4 mg Oral DAILY  thiamine HCL (B-1) tablet 100 mg  100 mg Oral DAILY  traZODone (DESYREL) tablet 300 mg  300 mg Oral QHS  venlafaxine-SR (EFFEXOR-XR) capsule 75 mg  75 mg Oral DAILY  insulin lispro (HUMALOG) injection   SubCUTAneous AC&HS  rivaroxaban (XARELTO) tablet 20 mg  20 mg Oral DAILY WITH BREAKFAST  clonazePAM (KlonoPIN) tablet 2 mg  2 mg Oral DAILY  clonazePAM (KlonoPIN) tablet 4 mg  4 mg Oral QHS Diet: 
DIET DIABETIC CONSISTENT CARB 
DIET NUTRITIONAL SUPPLEMENTS Other Studies (last 24 hours): No results found. Assessment and Plan:  
 
Hospital Problems as of 11/4/2020 Date Reviewed: 10/7/2020 Codes Class Noted - Resolved POA * (Principal) Cellulitis of abdominal wall ICD-10-CM: L03.311 ICD-9-CM: 682.2  10/31/2020 - Present Unknown Alcoholism ([facility] ICD-10-CM: D32.60 ICD-9-CM: 303.90  8/11/2020 - Present Yes Obesity, morbid ([facility] ICD-10-CM: E66.01 
ICD-9-CM: 278.01  6/24/2020 - Present Yes Controlled type 2 diabetes mellitus without complication, without long-term current use of insulin ([facility] ICD-10-CM: E11.9 ICD-9-CM: 250.00  6/24/2020 - Present Yes Bipolar disorder ([facility] ICD-10-CM: F31.9 ICD-9-CM: 296.80  8/19/2017 - Present Yes Overview Signed 6/24/2020  7:21 PM by Josh Jenkins MD  
  Last Assessment & Plan:  
Effexor Trazodone Essential hypertension ICD-10-CM: I10 
ICD-9-CM: 401.9  8/19/2017 - Present Yes Overview Signed 6/24/2020  7:21 PM by Josh Jenkins MD  
  Last Assessment & Plan:  
BPs in low normal range. -BPs continue to be normal range, not on home meds Gastroesophageal reflux disease with esophagitis ICD-10-CM: K21.00 ICD-9-CM: 530.11  8/19/2017 - Present Yes Overview Signed 6/24/2020  7:21 PM by Josh Jenkins MD  
  Last Assessment & Plan:  
Protonix BID Hypothyroidism ICD-10-CM: E03.9 ICD-9-CM: 244.9  8/19/2017 - Present Yes Overview Signed 6/24/2020  7:21 PM by Josh Jenkins MD  
  Last Assessment & Plan:  
Continue levothyroxine Check TSH 
  
  
   
  
 
 
A/P:   
Abdominal wall wound status post trauma 
 vancomycin stopped 11/3 and Zosyn onging 10/31 General surgery w recs for non surgical management Wound care consult and wound culture growing gram negative rods Wound care per wound nursing 
 
  
Left shoulder pain with radiculopathy Hx of cervical fusion done at [facility] Has shoulder ROM but also has acute right 5th finger weakness and dullness Obtain shoulder x ray Consult PT  
  
Bipolar Unfortunately we do not have Latuda on formulary- substitute with Abilify for now Pt does not have anyone to bring him his home [place] Cont Lamictal and Klonopin 
  
Diabetes mellitus type 2 Humalog sliding scale insulin. 
  
Alcohol dependence Monitor for signs of alcohol withdrawal. 
States last drank over 1 week prior to admission. On Vitamins 
  
Recent PE Uncertain etiology Continue home Xarelto. 
  
Obstructive sleep apnea CPAP. Right shoulder greater tuberosity fx: 
Seen by ortho 11/2 
nonoperative at present Sling ordered Will need 3 week follow up with ortho Cervical spondylosis: 
Per ortho Signed: 
Sarai Castro NP

## 2020-11-04 NOTE — WOUND CARE
Patient seen for follow up on abdominal wound. Noted there is still small amount of blue-green colored drainage but no sweet odor today. Black necrotic tissue starting to soften. Patient stated he did his own dressing twice yesterday and was feeling good about being able to do so at home. He felt periodic follow up at an outpatient clinic wound be beneficial. Recommend to continue Dakin's soak dressings and follow up with OP wound center when discharged. Answered all questions at bedside.

## 2020-11-04 NOTE — PROGRESS NOTES
Comprehensive Nutrition Assessment Type and Reason for Visit: Mena Camelia Nutrition Recommendations/Plan:  
Continue current diet Continue Ensure High Protein Nutrition Assessment:  60year old morbidly obese gentleman with a h/o diabetes, anxiety, bipolar disorder and alcohol abuse admitted with an abdominal wound which occurred after a fall onto a coffee table with an unknown time down. Patient seen in follow-up. He states that he is doing well. He reports a good appetite and eating well. He states that he is eating nearly all of every meal. He states that he has been drinking ensure high protein with each meal and would like to change to vanilla flavor. Estimated Daily Nutrient Needs: 
Energy (kcal): 3291-4151(09-77 kcal/kg (139.3 kg)) Protein (g): (1.2-1.5 gm pro/kgIBW/day - open wound, obese) Wounds:   
Open wounds Current Nutrition Therapies: DIET DIABETIC CONSISTENT CARB Regular; 2 GM NA (House Low NA) DIET NUTRITIONAL SUPPLEMENTS All Meals; Ensure High Protein Anthropometric Measures: 
· Height:  6' (182.9 cm)(per EMR) · Current Body Wt:  139.3 kg (307 lb 1.6 oz)(standing scale) · Admission Body Wt:  314 lb 6 oz(bed scale) · Usual Body Wt:  150 kg (330 lb 11 oz)(\"3 months ago\" per patient) · Ideal Body Wt:  178 lbs:  176.6 % · Body mass index is 41.64 kg/m². · BMI Category:  Obese class 3 (BMI 40.0 or greater) Nutrition Diagnosis:  
· Inadequate protein intake related to acute injury/trauma, increased demand for energy/nutrients as evidenced by wounds Nutrition Interventions:  
Food and/or Nutrient Delivery: Continue current diet, Continue oral nutrition supplement Goals: 
Meet >75% of his daily nutrition needs and minimize hyperglycemia. Nutrition Monitoring and Evaluation:  
Food/Nutrient Intake Outcomes: Food and nutrient intake, Supplement intake Discharge Planning: Too soon to determine [address] [place], LD on 11/4/2020 at 3:34 PM 
Contact: [phone]

## 2020-11-04 NOTE — ROUTINE PROCESS
END OF SHIFT NOTE: 
 
INTAKE/OUTPUT 
11/03 0701 - 11/04 0700 In: 2486 [I.V.:2486] Out: 2289 [[id number]] Voiding: YES Catheter: NO 
Drain:   
 
 
 
 
 
Flatus: Patient does have flatus present. Stool:  1 occurrences. Characteristics: 
Stool Assessment Stool Color: [email or web address] Stool Appearance: Formed Stool Amount: Medium Stool Source/Status: Rectum Emesis: 0 occurrences. Characteristics: VITAL SIGNS Patient Vitals for the past 12 hrs: 
 Temp Pulse Resp BP SpO2  
11/04/20 1718  74  137/82   
11/04/20 1510 97.9 °F (36.6 °C) 74  137/82   
11/04/20 1133 97.8 °F (36.6 °C) 83  (!) 143/98 98 % 11/04/20 0900  83  (!) 143/98   
11/04/20 0834  79  (!) 172/91   
11/04/20 0720 97.8 °F (36.6 °C) 79 19 (!) 172/91 95 % Pain Assessment Pain Intensity 1: 5 (11/04/20 1510) Pain Location 1: Shoulder Pain Intervention(s) 1: Medication (see MAR) Patient Stated Pain Goal: 0 Ambulating Yes, up in room, sat in chair part of shift. Shift report given to oncoming nurse at the bedside.  
 
Amy Lucas RN

## 2020-11-04 NOTE — PROGRESS NOTES
END OF SHIFT NOTE: 
 
INTAKE/OUTPUT 
11/03 0701 - 11/04 0700 In: 2486 [I.V.:2486] Out: 6852 [[facility]:0463] Voiding: YES Catheter: NO 
Drain:   
 
 
 
 
 
Flatus: Patient does have flatus present. Stool:  0 occurrences. Characteristics: 
Stool Assessment Stool Color: Yellow Stool Appearance: Formed Stool Amount: Small Stool Source/Status: Rectum Emesis: 0 occurrences. Characteristics: VITAL SIGNS Patient Vitals for the past 12 hrs: 
 Temp Pulse Resp BP SpO2  
11/04/20 0403 98.1 °F (36.7 °C) 77 19 (!) 166/95 95 % 11/03/20 2337 97.9 °F (36.6 °C) 82 19 (!) 157/83 94 % 11/03/20 2037    (!) 173/113   
11/03/20 1936 98.8 °F (37.1 °C) 81 19 (!) 173/113 95 % Pain Assessment Pain Intensity 1: 5 (11/04/20 0412) Pain Location 1: Head 
Pain Intervention(s) 1: Medication (see MAR) Patient Stated Pain Goal: 0 Ambulating Yes Shift report given to oncoming nurse at the bedside. [address]

## 2020-11-04 NOTE — PROGRESS NOTES
Problem: Pressure Injury - Risk of 
Goal: *Prevention of pressure injury Description: Document Cale Scale and appropriate interventions in the flowsheet. Outcome: Progressing Towards Goal 
Note: Pressure Injury Interventions: 
Sensory Interventions: Assess changes in LOC, Check visual cues for pain Moisture Interventions: Absorbent underpads, Apply protective barrier, creams and emollients, Minimize layers Activity Interventions: Increase time out of bed, Pressure redistribution bed/mattress(bed type) Mobility Interventions: HOB 30 degrees or less, Pressure redistribution bed/mattress (bed type) Nutrition Interventions: Document food/fluid/supplement intake Problem: Patient Education: Go to Patient Education Activity Goal: Patient/Family Education Outcome: Progressing Towards Goal 
  
Problem: Falls - Risk of 
Goal: *Absence of Falls Description: Document Damaris Heads Fall Risk and appropriate interventions in the flowsheet. Outcome: Progressing Towards Goal 
Note: Fall Risk Interventions: 
Mobility Interventions: Communicate number of staff needed for ambulation/transfer, Patient to call before getting OOB Medication Interventions: Evaluate medications/consider consulting pharmacy, Patient to call before getting OOB, Teach patient to arise slowly Elimination Interventions: Call light in reach, Patient to call for help with toileting needs History of Falls Interventions: Evaluate medications/consider consulting pharmacy, Investigate reason for fall, Room close to nurse's station Problem: Patient Education: Go to Patient Education Activity Goal: Patient/Family Education Outcome: Progressing Towards Goal 
  
Problem: Patient Education: Go to Patient Education Activity Goal: Patient/Family Education Outcome: Progressing Towards Goal 
  
Problem: Patient Education: Go to Patient Education Activity Goal: Patient/Family Education Outcome: Progressing Towards Goal

## 2020-11-05 NOTE — PROGRESS NOTES
END OF SHIFT NOTE: 
 
INTAKE/OUTPUT 
11/04 0701 - 11/05 0700 In: 1998 [I.V.:1998] Out: 9429 [JGP:8515] Voiding: YES Catheter: NO 
Drain:   
 
 
 
 
 
Flatus: Patient does have flatus present. Stool:  0 occurrences. Characteristics: 
 
Emesis: 0 occurrences. Characteristics: VITAL SIGNS Patient Vitals for the past 12 hrs: 
 Temp Pulse Resp BP SpO2  
11/05/20 0405 97.5 °F (36.4 °C) 80 18 (!) 151/89 95 % 11/05/20 0101    (!) 169/90   
11/05/20 0021 98.1 °F (36.7 °C) 75 18 (!) 171/101 93 % 11/04/20 2345 98.4 °F (36.9 °C) 99 18 (!) 154/81 98 % 11/04/20 2026 98.2 °F (36.8 °C) 71 18 (!) 155/75 93 % 11/04/20 1922 99 °F (37.2 °C) 68 19 (!) 170/98 98 % Pain Assessment Pain Intensity 1: 5 (11/05/20 0050) Pain Location 1: Shoulder Pain Intervention(s) 1: Medication (see MAR) Patient Stated Pain Goal: 0 Ambulating Yes Shift report given to oncoming nurse at the bedside. [address] [facility] [address]

## 2020-11-05 NOTE — PROGRESS NOTES
TRANSFER - OUT REPORT: 
 
Verbal report given to [facility] RN(name) on [employer]  being transferred to [facility](unit) for routine progression of care Report consisted of patients Situation, Background, Assessment and  
Recommendations(SBAR). Information from the following report(s) SBAR, Kardex and MAR was reviewed with the receiving nurse. Lines:    
 
Opportunity for questions and clarification was provided. Patient transported with: 
 transported to [facility]

## 2020-11-05 NOTE — PROGRESS NOTES
Patient ambulating in hallway with CNA. Telephone Encounter by Srinath Dickson RMA at 04/18/17 02:23 PM     Author:  Srinath Dickson RMA Service:  (none) Author Type:  Certified Medical Assistant     Filed:  04/18/17 02:23 PM Encounter Date:  4/17/2017 Status:  Signed     :  Srinath Dickson RMA (Certified Medical Assistant)            To Non Clinical[AK1.1T]  See below, please schedule appointment and notify of information below[AK1.1M]      Revision History        User Key Date/Time User Provider Type Action    > AK1.1 04/18/17 02:23 PM Srinath Dickson RMA Certified Medical Assistant Sign    M - Manual, T - Template

## 2020-11-05 NOTE — PROGRESS NOTES
Pt is medically ready for dc to [facility] today, pt will be going to room 418B report [phone] station 4, WC van arranged for transport to facility with round trip for 245 pm transport, pt updated on pending transport time and agreeable Care Management Interventions PCP Verified by CM: Yes Transition of Care Consult (CM Consult): SNF Partner SNF: No 
Reason Why Partner SNF Not Chosen: Location Discharge Durable Medical Equipment: No 
Physical Therapy Consult: Yes Occupational Therapy Consult: Yes Speech Therapy Consult: No 
Current Support Network: Own Home, Lives Alone Confirm Follow Up Transport: Family The Plan for Transition of Care is Related to the Following Treatment Goals : to rehab to increase mobility and wound care The Patient and/or Patient Representative was Provided with a Choice of Provider and Agrees with the Discharge Plan?: Yes Name of the Patient Representative Who was Provided with a Choice of Provider and Agrees with the Discharge Plan: pt 
Freedom of Choice List was Provided with Basic Dialogue that Supports the Patient's Individualized Plan of Care/Goals, Treatment Preferences and Shares the Quality Data Associated with the Providers?: Yes Discharge Location Discharge Placement: Skilled nursing facility

## 2020-11-05 NOTE — DISCHARGE SUMMARY
Hospitalist Discharge Summary Admit Date:  10/31/2020  8:31 AM  
Name:  Martina Fernandez. Age:  60 y.o. 
:  1965 MRN:  [id number] PCP:  Isabella Larson MD 
Treatment Team: Attending Provider: Peter Moon MD; Utilization Review: Shannan Chatterjee; Care Manager: Michelle Aguilar, RN; Utilization Review: Argelia Méndez, ASIF; Occupational Therapy Assistant: Elie Beltrán Problem List for this Hospitalization: 
Hospital Problems as of 2020 Date Reviewed: 10/7/2020 Codes Class Noted - Resolved POA * (Principal) Cellulitis of abdominal wall ICD-10-CM: L03.311 ICD-9-CM: 682.2  10/31/2020 - Present Unknown Alcoholism ([facility] ICD-10-CM: D47.66 ICD-9-CM: 303.90  2020 - Present Yes Obesity, morbid ([facility] ICD-10-CM: E66.01 
ICD-9-CM: 278.01  2020 - Present Yes Controlled type 2 diabetes mellitus without complication, without long-term current use of insulin ([facility] ICD-10-CM: E11.9 ICD-9-CM: 250.00  2020 - Present Yes Bipolar disorder ([facility] ICD-10-CM: F31.9 ICD-9-CM: 296.80  2017 - Present Yes Overview Signed 2020  7:21 PM by Isabella Larson MD  
  Last Assessment & Plan:  
Effexor Trazodone Essential hypertension ICD-10-CM: I10 
ICD-9-CM: 401.9  2017 - Present Yes Overview Signed 2020  7:21 PM by Isabella Larson MD  
  Last Assessment & Plan:  
BPs in low normal range. -BPs continue to be normal range, not on home meds Gastroesophageal reflux disease with esophagitis ICD-10-CM: K21.00 ICD-9-CM: 530.11  2017 - Present Yes Overview Signed 2020  7:21 PM by Isabella Larson MD  
  Last Assessment & Plan:  
Protonix BID Hypothyroidism ICD-10-CM: E03.9 ICD-9-CM: 244.9  2017 - Present Yes Overview Signed 2020  7:21 PM by Isabella Larson MD  
  Last Assessment & Plan:  
Continue levothyroxine Check TSH 
  
  
   
  
 
 
 
 Admission HPI from 10/31/2020:   
47-year-old male with pmh HTN, DM, hypothyroidism, anxiety, bipolar disorder who presented to the ER because of wound on his abdomen x 1 week.  Pt fell into a sofa 1 week ago and wound has been worsening since.   He was seen in outside ER and was given Tetanus shot and prescribed doxycycline with no improvement.  Started on Vanc/Zosyn.  10/31 Surgery evaluated wound and stated non surgical tx plan. 11/2: seen by ortho for right shoulder fx. Will follow up outpatient 3 weeks. Hospital Course: 
 Sling for right shoulder. He will follow up with ortho in 3 weeks. Alert and oriented x3. Afebrile. Pseudomonas growth noted from wound culture. ABT changed to oral Cipro for 10 days. No leukocytosis. Will have daily dressing changes to abdominal wound. Patient uses CPAP machine at night that he has not had since admission. He states wearing oxygen at home. This is ok until CPAP machine can be brought to him. Follow up instructions and discharge meds at bottom of this note. Plan was discussed with patient, CM. All questions answered. Patient was stable at time of discharge. 10 systems reviewed and negative except as noted in HPI. Diagnostic Imaging/Tests:  
Xr Shoulder Rt Ap/lat Min 2 V Result Date: 11/1/2020 EXAMINATION: XR SHOULDER RT AP/LAT MIN 2 V 11/1/2020 1:05 PM COMPARISON: None available. INDICATION: pain after fall last week, right finger numb and weak TECHNIQUE: 3 views of the right shoulder were obtained FINDINGS: There may be a subtle multislice fracture of the greater tuberosity seen only on one view. Moderate glenohumeral joint arthrosis with osteophyte formation. Bony mineralization is preserved. The surrounding soft tissues are normal.  
 
IMPRESSION: 1. Possible subtle nondisplaced fracture of the greater tuberosity. 2. Moderate glenohumeral joint arthrosis. Xr Abd (kub) Result Date: 11/1/2020 KUB: CLINICAL HISTORY:  Bilateral quadrant abdominal wound after injury from fall. COMPARISON:  None. FINDINGS:  AP supine images demonstrates a moderate amount of stool scattered in the colon with gas in a few loops of nondilated small bowel in a nonspecific pattern. No abnormal soft tissue mass or calcific density is noted. Right hip prosthesis. IMPRESSION:  NONSPECIFIC BOWEL GAS PATTERN. Echocardiogram results: No results found for this visit on 10/31/20. All Micro Results Procedure Component Value Units Date/Time CULTURE, Lala Crooked STAIN [[id number]]  (Abnormal)  (Susceptibility) Collected:  11/02/20 1340 Order Status:  Completed Specimen:  Wound from Abdomen Updated:  11/05/20 7308 Special Requests: NO SPECIAL REQUESTS     
  GRAM STAIN 25 TO 50 WBCS SEEN PER OIF  
   FEW GRAM NEGATIVE RODS Culture result: MODERATE PSEUDOMONAS AERUGINOSA CULTURE, BLOOD [[id number]] Collected:  10/31/20 0936 Order Status:  Completed Specimen:  Blood Updated:  11/05/20 0734 Special Requests: --     
  RIGHT 
HAND Culture result: NO GROWTH 5 DAYS     
 CULTURE, BLOOD [[id number]] Collected:  10/31/20 0904 Order Status:  Completed Specimen:  Blood Updated:  11/05/20 0734 Special Requests: RIGHT ANTECUBITAL Culture result: NO GROWTH 5 DAYS Labs: Results:  
   
BMP, Mg, Phos Recent Labs 11/05/20 0410 11/04/20 0536 11/03/20 
2338  141 143  
K 3.9 3.4* 3.7 * 109* 110* CO2 28 27 27 AGAP 3* 5* 6*  
BUN 11 9 8 CREA 0.89 0.86 0.95  
CA 8.4 8.2* 8.0*  
GLU 84 93 93 CBC Recent Labs 11/05/20 0410 11/04/20 0536 11/03/20 
0845 WBC 7.3 5.9 5.1  
RBC 3.18* 3.24* 3.55* HGB 10.4* 10.6* 11.7* HCT 30.9* 31.9* 35.0*  
 204 201 GRANS 59 55 55 LYMPH 28 30 30 EOS 3 3 3 MONOS 8 10 10 BASOS 1 1 1 IG 2 1 1 ANEU 4.3 3.2 2.8 ABL 2.0 1.8 1.6 MIA 0.2 0.2 0.2 ABM 0.6 0.6 0.5 ABB 0.1 0.1 0.1 AIG 0.1 0.1 0.1 LFT No results for input(s): ALT, TBIL, AP, TP, ALB, GLOB, AGRAT in the last 72 hours. No lab exists for component: SGOT, GPT Cardiac Testing No results found for: BNPP, BNP, CPK, RCK1, RCK2, RCK3, RCK4, CKMB, CKNDX, CKND1, TROPT, TROIQ Coagulation Tests No results found for: PTP, INR, APTT, INREXT A1c Lab Results Component Value Date/Time Hemoglobin A1c 5.3 06/29/2020 09:16 AM  
  
Lipid Panel Lab Results Component Value Date/Time Cholesterol, total 136 08/13/2020 07:52 AM  
 HDL Cholesterol 56 08/13/2020 07:52 AM  
 LDL, calculated 36 08/13/2020 07:52 AM  
 VLDL, calculated 44 (H) 08/13/2020 07:52 AM  
 Triglyceride 221 (H) 08/13/2020 07:52 AM  
  
Thyroid Panel Lab Results Component Value Date/Time TSH 1.930 06/29/2020 09:16 AM  
 T4, Free 1.09 06/29/2020 09:16 AM  
    
Most Recent UA No results found for: COLOR, APPRN, REFSG, KODY, PROTU, GLUCU, KETU, BILU, BLDU, UROU, Amy Bal Allergies Allergen Reactions  Other Plant, Animal, Environmental Unknown (comments) Seasonal allergies (ragweed, etc.) Immunization History Administered Date(s) Administered  Influenza Vaccine Applitools) PF (>6 Mo Flulaval, Fluarix, and >3 Yrs [address] [place] [address]) 09/22/2020  TB Skin Test (PPD) Intradermal 11/02/2020  Td 01/01/2018  Tdap 01/01/2018 All Labs from Last 24 Hrs: 
Recent Results (from the past 24 hour(s)) GLUCOSE, POC Collection Time: 11/04/20  4:45 PM  
Result Value Ref Range Glucose (POC) 136 (H) 65 - 100 mg/dL GLUCOSE, POC Collection Time: 11/04/20  9:13 PM  
Result Value Ref Range Glucose (POC) 108 (H) 65 - 100 mg/dL CBC WITH AUTOMATED DIFF Collection Time: 11/05/20  4:10 AM  
Result Value Ref Range WBC 7.3 4.3 - 11.1 K/uL  
 RBC 3.18 (L) 4.23 - 5.6 M/uL  
 HGB 10.4 (L) 13.6 - 17.2 g/dL HCT 30.9 (L) 41.1 - 50.3 % MCV 97.2 79.6 - 97.8 FL  
 MCH 32.7 26.1 - 32.9 PG  
 MCHC 33.7 31.4 - 35.0 g/dL RDW 14.0 11.9 - 14.6 % PLATELET 554 626 - 553 K/uL MPV 8.9 (L) 9.4 - 12.3 FL ABSOLUTE NRBC 0.00 0.0 - 0.2 K/uL  
 DF AUTOMATED NEUTROPHILS 59 43 - 78 % LYMPHOCYTES 28 13 - 44 % MONOCYTES 8 4.0 - 12.0 % EOSINOPHILS 3 0.5 - 7.8 % BASOPHILS 1 0.0 - 2.0 % IMMATURE GRANULOCYTES 2 0.0 - 5.0 %  
 ABS. NEUTROPHILS 4.3 1.7 - 8.2 K/UL  
 ABS. LYMPHOCYTES 2.0 0.5 - 4.6 K/UL  
 ABS. MONOCYTES 0.6 0.1 - 1.3 K/UL  
 ABS. EOSINOPHILS 0.2 0.0 - 0.8 K/UL  
 ABS. BASOPHILS 0.1 0.0 - 0.2 K/UL  
 ABS. IMM. GRANS. 0.1 0.0 - 0.5 K/UL METABOLIC PANEL, BASIC Collection Time: 11/05/20  4:10 AM  
Result Value Ref Range Sodium 142 136 - 145 mmol/L Potassium 3.9 3.5 - 5.1 mmol/L Chloride 111 (H) 98 - 107 mmol/L  
 CO2 28 21 - 32 mmol/L Anion gap 3 (L) 7 - 16 mmol/L Glucose 84 65 - 100 mg/dL BUN 11 6 - 23 MG/DL Creatinine 0.89 0.8 - 1.5 MG/DL  
 GFR est AA >60 >60 ml/min/1.73m2 GFR est non-AA >60 >60 ml/min/1.73m2 Calcium 8.4 8.3 - 10.4 MG/DL  
GLUCOSE, POC Collection Time: 11/05/20  7:46 AM  
Result Value Ref Range Glucose (POC) 83 65 - 100 mg/dL GLUCOSE, POC Collection Time: 11/05/20 11:41 AM  
Result Value Ref Range Glucose (POC) 118 (H) 65 - 100 mg/dL Discharge Exam: 
Patient Vitals for the past 24 hrs: 
 Temp Pulse Resp BP SpO2  
11/05/20 0704 97.8 °F (36.6 °C) 66 18 (!) 148/86 96 % 11/05/20 0405 97.5 °F (36.4 °C) 80 18 (!) 151/89 95 % 11/05/20 0101    (!) 169/90   
11/05/20 0021 98.1 °F (36.7 °C) 75 18 (!) 171/101 93 % 11/04/20 2345 98.4 °F (36.9 °C) 99 18 (!) 154/81 98 % 11/04/20 2026 98.2 °F (36.8 °C) 71 18 (!) 155/75 93 % 11/04/20 1922 99 °F (37.2 °C) 68 19 (!) 170/98 98 % 11/04/20 1718  74  137/82   
11/04/20 1510 97.9 °F (36.6 °C) 74  137/82  Oxygen Therapy O2 Sat (%): 96 % (11/05/20 0704) Pulse via Oximetry: 80 beats per minute (11/01/20 2027) O2 Device: Room air (11/05/20 0900) FIO2 (%): 21 % (10/31/20 2158) Intake/Output Summary (Last 24 hours) at 11/5/2020 1209 Last data filed at 11/5/2020 6892 Gross per 24 hour Intake 1998 ml Output 3825 ml Net -1827 ml Physical exam: 
General:    Well nourished. Alert. No distress. Eyes:   Normal sclera. Extraocular movements intact. ENT:  Normocephalic, atraumatic. Moist mucous membranes CV:   Regular rate and rhythm. No murmur, rub, or gallop. Lungs:  Clear to auscultation bilaterally. No wheezing, rhonchi, or rales. Abdomen: Soft, nontender, nondistended. Bowel sounds normal.  
Extremities: Warm and dry. No cyanosis or edema. Neurologic: No focal deficits Skin:     No rashes or jaundice. Psych:  Normal mood and affect. Discharge Info:  
Current Discharge Medication List  
  
START taking these medications Details  
oxyCODONE IR (ROXICODONE) 5 mg immediate release tablet Take 1 Tab by mouth every four (4) hours as needed for Pain for up to 3 days. Max Daily Amount: 30 mg. 
Qty: 18 Tab, Refills: 0 Associated Diagnoses: Lumbar stenosis with neurogenic claudication  
  
ciprofloxacin HCl (CIPRO) 500 mg tablet Take 1 Tab by mouth two (2) times a day for 10 days. Qty: 20 Tab, Refills: 0 CONTINUE these medications which have CHANGED Details  
carvediloL (COREG) 6.25 mg tablet Take 1 Tab by mouth two (2) times daily (with meals). Qty: 60 Tab, Refills: 11  
  
clonazePAM (KlonoPIN) 2 mg tablet Take 1 Tab by mouth daily for 3 days. Max Daily Amount: 2 mg. Qty: 3 Tab, Refills: 0 Associated Diagnoses: Lumbar stenosis with neurogenic claudication CONTINUE these medications which have NOT CHANGED Details  
spironolactone (ALDACTONE) 50 mg tablet Take 50 mg by mouth daily. gabapentin (NEURONTIN) 100 mg capsule Take  by mouth three (3) times daily. rivaroxaban (Xarelto) 20 mg tab tablet Take 1 Tab by mouth daily (with breakfast). Qty: 30 Tab, Refills: 5  Comments: Start after completing 15 mg  
  
 testosterone cypionate (DEPOTESTOTERONE CYPIONATE) 200 mg/mL injection 1 ML BY INTRAMUSCULAR ROUTE EVERY MONTH. MAX DAILY AMOUNT: 200 MG. Qty: 3 mL, Refills: 3 Comments: Not to exceed 5 additional fills before 12/26/2020 Associated Diagnoses: Hypogonadism in male  
  
atorvastatin (LIPITOR) 10 mg tablet Take 1 Tab by mouth daily. Qty: 30 Tab, Refills: 11 Latuda 80 mg tab tablet Take 80 mg by mouth daily (with breakfast). traZODone (DESYREL) 150 mg tablet Take 2 Tabs by mouth nightly. Qty: 60 Tab, Refills: 11  
  
levothyroxine (SYNTHROID) 112 mcg tablet Take 1 Tab by mouth Daily (before breakfast). Qty: 30 Tab, Refills: 11  
  
olmesartan-hydroCHLOROthiazide (BENICAR HCT) 40-12.5 mg per tablet Take 1 Tab by mouth daily. Qty: 30 Tab, Refills: 11  
  
omeprazole (PRILOSEC) 20 mg capsule Take 1 Cap by mouth two (2) times a day. Qty: 60 Cap, Refills: 11 Syringe with Needle, Disp, (BD Luer-Taras Syringe) 3 mL 18 x 1 1/2\" syrg Use as directed Qty: 100 Syringe, Refills: prn  
  
lamoTRIgine (LAMICTAL) 200 mg tablet Take 200 mg by mouth two (2) times a day. venlafaxine-SR (EFFEXOR-XR) 75 mg capsule Take 75 mg by mouth daily. folic acid (FOLVITE) 1 mg tablet TAKE 1 TABLET BY MOUTH EVERY DAY Qty: 90 Tab, Refills: 3  
  
naltrexone microspheres (VIVITROL) 380 mg ER injection 380 mg by IntraMUSCular route now for 1 dose. Given today in office - right glute - medication shipped to office from pt's pharmacy. - pt supplied  Indications: alcoholism 
Qty: 380 mg, Refills: 0  
  
finasteride (PROSCAR) 5 mg tablet Take 1 Tab by mouth daily. Qty: 30 Tab, Refills: 11  
  
tamsulosin (FLOMAX) 0.4 mg capsule Take 1 Cap by mouth daily. Qty: 30 Cap, Refills: 11 Needle, Disp, 22 G 22 gauge x 1\" ndle Use as directed Qty: 100 Each, Refills: prn  
  
dapagliflozin (Farxiga) 10 mg tab tablet Take 1 Tab by mouth daily. Qty: 30 Tab, Refills: 11 STOP taking these medications doxycycline (VIBRAMYCIN) 100 mg capsule Comments:  
Reason for Stopping:   
   
 Klor-Con M20 20 mEq tablet Comments:  
Reason for Stopping:   
   
 thiamine HCL (B-1) 100 mg tablet Comments:  
Reason for Stopping:   
   
 doxycycline (VIBRAMYCIN) 100 mg capsule Comments:  
Reason for Stopping:   
   
  
 
 
 
Disposition: SNF Activity: Activity as tolerated Diet: DIET DIABETIC CONSISTENT CARB Regular; 2 GM NA (House Low NA) DIET NUTRITIONAL SUPPLEMENTS All Meals; Ensure High Protein Follow-up Appointments Procedures  FOLLOW UP VISIT Appointment in: Other (Specify) 3 weeks with Dr. Eliseo Chatterjee. Call [facility] [phone] for appointment 3 weeks with Dr. Eliseo Chatterjee. Call [facility] [phone] for appointment Standing Status:   Standing Number of Occurrences:   1 Order Specific Question:   Appointment in Answer: Other (Specify)  FOLLOW UP VISIT Appointment in: 3 - [address] MD  
  Standing Status:   Standing Number of Occurrences:   1 Standing Expiration Date:   11/6/2020 Order Specific Question:   Appointment in Answer:   3 - 5 Days Follow-up Information Follow up With Specialties Details Why Contact Info [facility] [facility] ([facility]) [address]   [address] 
[phone] Randi Tubbs MD Orthopedic Surgery   [address] [address] Elyssa Murray MD Internal Medicine   [address] [address] 
[phone] Time spent in patient discharge planning and coordination 35 minutes.  
 
Signed: 
Lissett Coles NP

## 2020-11-05 NOTE — PROGRESS NOTES
Problem: Mobility Impaired (Adult and Pediatric) Goal: *Acute Goals and Plan of Care (Insert Text) Description: LTG: 
(1.)Mr. More Su will move from supine to sit and sit to supine, scoot up and down and roll side to side in flat bed with INDEPENDENT within 7 day(s). (2.)Mr. More Su will transfer from bed to chair and chair to bed with INDEPENDENT using the least restrictive device within 7 day(s). (3.)Mr. More Su will ambulate with modified INDEPENDENCE for 250+ feet with the least restrictive/no device and sitting rest breaks as needed within 7 day(s). (4.)Mr. More Su will perform standing static and dynamic balance activities x 8 minutes with SUPERVISION to improve safety within 7 day(s). (5.)Mr. More Su will ascend and descend 4 stairs using one hand rail(s) with SUPERVISION to improve functional mobility and safety within 7 day(s). Outcome: Progressing Towards Goal 
  
PHYSICAL THERAPY: Daily Note 11/5/2020 INPATIENT: PT Visit Days : 3 Sling R UE Payor: Deepthi Canales / Plan: [place] [employer] / Product Type: PPO /   
  
NAME/AGE/GENDER: Jannie Warren. is a 60 y.o. male PRIMARY DIAGNOSIS: Cellulitis of abdominal wall [L03.311] Cellulitis of abdominal wall Cellulitis of abdominal wall ICD-10: Treatment Diagnosis:  
 · Other abnormalities of gait and mobility (R26.89) · Repeated Falls (R29.6) Precaution/Allergies: Other plant, animal, environmental  
  
ASSESSMENT:  
 
Mr. More Su Has a sling on R UE for recent R greater tuberosity fracture. He is up in the recliner and is happy to walk. He stood and increased his gait distance to about 200 feet with mostly SBA. He was fatigued with this distance saying he did not sleep well last night due to pain in his shoulder. He plans to go to rehab upon discharge as he lives alone. Working toward above goals. This section established at most recent assessment PROBLEM LIST (Impairments causing functional limitations): 
 1. Decreased Transfer Abilities 2. Decreased Ambulation Ability/Technique 3. Decreased Balance 4. Increased Pain 5. Decreased Activity Tolerance INTERVENTIONS PLANNED: (Benefits and precautions of physical therapy have been discussed with the patient.) 1. Balance Exercise 2. Bed Mobility 3. Gait Training 4. Home Exercise Program (HEP) 5. Neuromuscular Re-education/Strengthening 6. Therapeutic Activites 7. Therapeutic Exercise/Strengthening 8. Transfer Training 9. education TREATMENT PLAN: Frequency/Duration: 3 times a week for duration of hospital stay Rehabilitation Potential For Stated Goals: Excellent REHAB RECOMMENDATIONS (at time of discharge pending progress):   
Placement: It is my opinion, based on this patient's performance to date, that Mr. Fannie Shone may benefit from intensive therapy at a [address] [place] [address] after discharge due to the functional deficits listed above that are likely to improve with skilled rehabilitation and concerns that he/she may be unsafe to be unsupervised at home due to frequent falls and now unable to use R UE . Equipment: ? Tbd-cane???  
    
 
 
 
HISTORY:  
History of Present Injury/Illness (Reason for Referral): 
Per MD note, \"This is a 73-year-old male with past medical history significant for hypertension, non-insulin-dependent diabetes mellitus type 2, hypothyroidism, anxiety, bipolar disorder presented to the ER because of wound on his abdomen since 1 week. Patient states that he has been doing fairly well until few days ago when he was fixing his new sofa and fell onto a coffee table and hurt his abdomen. No visible bleeding. No fever. He reports some chills. No nausea no vomiting. He was seen in outside ER and was given Tetanus shot and prescribed doxycycline. Followed up with his primary care doctor and the wound has been progressively worsening over the last week. He denies any chest pain cough or shortness of breath. 10 systems reviewed and negative except as noted in HPI. Patient admitted for further evaluation management of abdominal wound. \" 
Past Medical History/Comorbidities:  
Mr. Jessi Rodas  has a past medical history of Anxiety, Chronic back pain, Colon polyps, Depression, Diabetes ([address] GERD (gastroesophageal reflux disease), Hemorrhoids, Hypertension, and Hypothyroidism. Mr. Jessi Rodas  has a past surgical history that includes hx back surgery (08/2019); hx cholecystectomy (06/2014); hx hip replacement (Right, 07/2011); hx other surgical (09/2016); and hx other surgical (07/2015). Social History/Living Environment:  
Home Environment: Private residence # Steps to Enter: 2 One/Two Story Residence: [facility] level # of Interior Steps: 15 Living Alone: Yes Support Systems: Family member(s) Patient Expects to be Discharged to[de-identified] Rehabilitation facility Current DME Used/Available at Home: CPAP Tub or Shower Type: Shower Prior Level of Function/Work/Activity: 
Patient reports that he lives alone and ambulates independently but falls frequently due to dizziness. Number of Personal Factors/Comorbidities that affect the Plan of Care: 1-2: MODERATE COMPLEXITY EXAMINATION:  
Most Recent Physical Functioning:  
Gross Assessment: 
  
         
  
Posture: 
  
Balance: 
  Bed Mobility: 
  
Wheelchair Mobility: 
  
Transfers: 
Sit to Stand: Independent Stand to Sit: Independent Gait: 
  
Speed/Juliana: Accelerated Distance (ft): 300 Feet (ft) Assistive Device: (none) Body Structures Involved: 1. Bones 2. Joints 3. Muscles 4. Ligaments Body Functions Affected: 1. Sensory/Pain 2. Neuromusculoskeletal 
3. Movement Related 4. Skin Related Activities and Participation Affected: 1. Mobility 2. Self Care 3. Domestic Life 4. Community, Social and [place] Number of elements that affect the Plan of Care: 4+: HIGH COMPLEXITY CLINICAL PRESENTATION:  
 Presentation: Evolving clinical presentation with changing clinical characteristics: MODERATE COMPLEXITY CLINICAL DECISION MAKIN[address] [place] [id number] AM-PAC 6 Clicks Basic Mobility Inpatient Short Form How much difficulty does the patient currently have. .. Unable A Lot A Little None 1. Turning over in bed (including adjusting bedclothes, sheets and blankets)? [] 1   [] 2   [x] 3   [] 4  
2. Sitting down on and standing up from a chair with arms ( e.g., wheelchair, bedside commode, etc.)   [] 1   [] 2   [x] 3   [] 4  
3. Moving from lying on back to sitting on the side of the bed? [] 1   [] 2   [x] 3   [] 4 How much help from another person does the patient currently need. .. Total A Lot A Little None 4. Moving to and from a bed to a chair (including a wheelchair)? [] 1   [] 2   [x] 3   [] 4  
5. Need to walk in hospital room? [] 1   [] 2   [x] 3   [] 4  
6. Climbing 3-5 steps with a railing? [] 1   [] 2   [x] 3   [] 4  
© , Trustees of [address] [place] [address] [address] [id number], under license to [employer]. All rights reserved Score:  Initial: 18 Most Recent: X (Date: -- ) Interpretation of Tool:  Represents activities that are increasingly more difficult (i.e. Bed mobility, Transfers, Gait). Medical Necessity:    
· Patient is expected to demonstrate progress in  
· balance and functional technique ·  to  
· increase independence with   and improve safety during all functional mobility · . Reason for Services/Other Comments: 
· Patient continues to require skilled intervention due to · medical complications and patient unable to attend/participate in therapy as expected · . Use of outcome tool(s) and clinical judgement create a POC that gives a: Clear prediction of patient's progress: LOW COMPLEXITY  
  
 
 
 
TREATMENT:  
(In addition to Assessment/Re-Assessment sessions the following treatments were rendered) Pre-treatment Symptoms/Complaints:  none. Pain: Initial: Pain Intensity 1: 0  Post Session:  2 Therapeutic Activity: (    10 minutes): Therapeutic activities including Chair transfers and Ambulation on level ground to improve mobility, strength, balance and endurance. Required no   to promote static and dynamic balance in standing. Date: 11/2/20 11/3/20 Ambulation Device 
assistance 100' Gait belt CGA 75'x2 Gait belt CGA Partial Squats Hip Abduction/ Adduction Standing Heel Raises Standing X20  x20 Toe Raises Standing x20 x20 Hip Flexion Standing x10 Sit to Stand  x10 Key:  R=right, L=left, B=bilaterally, A=active, AA=active assisted, P=passive Braces/Orthotics/Lines/Etc:  
· IV Treatment/Session Assessment:   
· Response to Treatment:  pleasant and cooperative. · Interdisciplinary Collaboration:  
o Physical Therapy Assistant 
o Registered Nurse · After treatment position/precautions:  
o Up in chair 
o Bed/Chair-wheels locked 
o Bed in low position 
o Call light within reach 
o RN notified · Compliance with Program/Exercises: Compliant all of the time · Recommendations/Intent for next treatment session: \"Next visit will focus on advancements to more challenging activities and reduction in assistance provided\". Total Treatment Duration: PT Patient Time In/Time Out Time In: 0935 Time Out: [id number] Nona Starks PTA

## 2022-05-10 NOTE — DISCHARGE INSTRUCTIONS
3 week follow up with orthopedic  Dressing change to abdomen:  Fantasmain's soak dressing daily  Cover with ABD  Pad  Further recommendations per wound care at facility oriented to person, place and time , normal sensation , short and long term memory intact

## 2023-02-27 NOTE — PROGRESS NOTES
END OF SHIFT NOTE: 
 
INTAKE/OUTPUT 
11/02 0701 - 11/03 0700 In: 5615 [I.V.:1845] Out: 5588 [BLEOV:5779] Voiding: YES Catheter: NO 
Drain:   
 
 
 
 
 
Flatus: Patient does have flatus present. Stool:  1 occurrences. Characteristics: 
Stool Assessment Stool Color: Yellow Stool Appearance: Formed Stool Amount: Small Stool Source/Status: Rectum Emesis: 0 occurrences. Characteristics: VITAL SIGNS Patient Vitals for the past 12 hrs: 
 Temp Pulse Resp BP SpO2  
11/03/20 1634    (!) 142/85   
11/03/20 1504 98.1 °F (36.7 °C) 79 20 (!) 158/100 96 % 11/03/20 1130 98 °F (36.7 °C) 94 19 (!) 168/87 95 % Pain Assessment Pain Intensity 1: 0 (11/03/20 1125) Pain Location 1: Shoulder, Neck Pain Intervention(s) 1: Rest 
Patient Stated Pain Goal: 3 Ambulating Yes Shift report given to oncoming nurse at the bedside.  
 
Cris Espino RN 
 
 
 Third attempt for pre-assessment prior to upcoming colonoscopy     No answer.  Left message to return call [phone] #4    Adrianna Douglas RN  Endoscopy Procedure Pre Assessment RN